# Patient Record
Sex: MALE | Race: WHITE | NOT HISPANIC OR LATINO | Employment: FULL TIME | URBAN - METROPOLITAN AREA
[De-identification: names, ages, dates, MRNs, and addresses within clinical notes are randomized per-mention and may not be internally consistent; named-entity substitution may affect disease eponyms.]

---

## 2017-06-12 ENCOUNTER — HOSPITAL ENCOUNTER (EMERGENCY)
Facility: CLINIC | Age: 37
Discharge: ANOTHER HOSPITAL | End: 2017-06-12
Attending: PHYSICIAN ASSISTANT | Admitting: PHYSICIAN ASSISTANT
Payer: OTHER MISCELLANEOUS

## 2017-06-12 ENCOUNTER — APPOINTMENT (OUTPATIENT)
Dept: CT IMAGING | Facility: CLINIC | Age: 37
End: 2017-06-12
Attending: PHYSICIAN ASSISTANT
Payer: OTHER MISCELLANEOUS

## 2017-06-12 ENCOUNTER — HOSPITAL ENCOUNTER (EMERGENCY)
Facility: CLINIC | Age: 37
Discharge: HOME | End: 2017-06-12
Attending: EMERGENCY MEDICINE | Admitting: EMERGENCY MEDICINE
Payer: OTHER MISCELLANEOUS

## 2017-06-12 VITALS
WEIGHT: 220 LBS | OXYGEN SATURATION: 98 % | SYSTOLIC BLOOD PRESSURE: 135 MMHG | RESPIRATION RATE: 16 BRPM | DIASTOLIC BLOOD PRESSURE: 96 MMHG | TEMPERATURE: 98 F | HEART RATE: 80 BPM | BODY MASS INDEX: 30.68 KG/M2

## 2017-06-12 VITALS
SYSTOLIC BLOOD PRESSURE: 148 MMHG | DIASTOLIC BLOOD PRESSURE: 115 MMHG | OXYGEN SATURATION: 98 % | RESPIRATION RATE: 18 BRPM | HEART RATE: 78 BPM | TEMPERATURE: 98.2 F

## 2017-06-12 DIAGNOSIS — S05.31XA LACERATION OF RIGHT CONJUNCTIVA, INITIAL ENCOUNTER: ICD-10-CM
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
## 2017-06-12 DIAGNOSIS — S05.91XA RIGHT EYE INJURY, INITIAL ENCOUNTER: ICD-10-CM

## 2017-06-12 PROCEDURE — 99283 EMERGENCY DEPT VISIT LOW MDM: CPT | Performed by: EMERGENCY MEDICINE

## 2017-06-12 PROCEDURE — 99285 EMERGENCY DEPT VISIT HI MDM: CPT | Mod: 25

## 2017-06-12 PROCEDURE — 70480 CT ORBIT/EAR/FOSSA W/O DYE: CPT

## 2017-06-12 PROCEDURE — 99207 ZZC APP CREDIT; MD BILLING SHARED VISIT: CPT | Mod: Z6 | Performed by: EMERGENCY MEDICINE

## 2017-06-12 RX ORDER — MOXIFLOXACIN 5 MG/ML
1 SOLUTION/ DROPS OPHTHALMIC
Qty: 1 BOTTLE | Refills: 0 | Status: SHIPPED | OUTPATIENT
Start: 2017-06-12 | End: 2017-06-19

## 2017-06-12 RX ORDER — ERYTHROMYCIN 5 MG/G
1 OINTMENT OPHTHALMIC 3 TIMES DAILY
Qty: 1 TUBE | Refills: 0 | Status: SHIPPED | OUTPATIENT
Start: 2017-06-12 | End: 2022-03-21

## 2017-06-12 ASSESSMENT — ENCOUNTER SYMPTOMS
CONSTIPATION: 0
BRUISES/BLEEDS EASILY: 0
HEADACHES: 0
BACK PAIN: 0
FREQUENCY: 0
SORE THROAT: 0
EYE DISCHARGE: 0
PALPITATIONS: 0
COUGH: 0
BLOOD IN STOOL: 0
DYSPHORIC MOOD: 0
NECK PAIN: 0
ADENOPATHY: 0
PHOTOPHOBIA: 1
DIZZINESS: 0
DIARRHEA: 0
EYE PAIN: 1
NUMBNESS: 0
ABDOMINAL PAIN: 0
EYE REDNESS: 1
SHORTNESS OF BREATH: 0
LIGHT-HEADEDNESS: 0
EYE REDNESS: 1
WEAKNESS: 0
HEMATURIA: 0
COLOR CHANGE: 0
POLYDIPSIA: 0
VOMITING: 0
DYSURIA: 0
NAUSEA: 0
FEVER: 0
NERVOUS/ANXIOUS: 0
VOICE CHANGE: 0
CHILLS: 0
TROUBLE SWALLOWING: 0
DIAPHORESIS: 0
EYE PAIN: 1

## 2017-06-12 ASSESSMENT — VISUAL ACUITY
OD: 20/100
OS: 20/15

## 2017-06-12 NOTE — ED AVS SNAPSHOT
[facility], Emergency Department    [address] [place] [address]    Phone:  [phone]                                       Andrae Ruelas   MRN: [id number]    Department:  [facility], Emergency Department   Date of Visit:  6/12/2017           Patient Information     Date Of Birth          1980        Your diagnoses for this visit were:     None       You were seen by Vickie Crowley MD.        Discharge Instructions           FOLLOW-UP:  - You have an appointment to follow up with Eye Clinic (phone: [phone]) at 8:30 AM tomorrow     PRESCRIPTIONS / MEDICATIONS:  - Please use the Vigamox drops 4 times a day, and the erythromycin ointment 3 times a day, as described by the Ophthalmology team.    --- Continue these medications until told otherwise by their team.    OTHER INSTRUCTIONS:  - Continue with the other recommendations from Ophthalmology    RETURN TO THE EMERGENCY DEPARTMENT  Return to the Emergency Department at any time for new/worsening symptoms.       24 Hour Appointment Hotline       To make an appointment at any [facility] clinic, call [phone] ([phone]). If you don't have a family doctor or clinic, we will help you find one. [facility] clinics are conveniently located to serve the needs of you and your family.             Review of your medicines      START taking        Dose / Directions Last dose taken    erythromycin ophthalmic ointment   Commonly known as:  ROMYCIN   Dose:  1 Application   Quantity:  1 Tube        Place 1 Application into the right eye 3 times daily   Refills:  0        moxifloxacin 0.5 % ophthalmic solution   Commonly known as:  VIGAMOX   Dose:  1 drop   Quantity:  1 Bottle        Place 1 drop into the right eye every 4 hours (while awake) for 7 days   Refills:  0          Our records show that you are taking the medicines listed below. If these are incorrect, please call your family doctor or clinic.        Dose / Directions Last dose  taken    clobetasol 0.05 % external solution   Commonly known as:  TEMOVATE        Refills:  6        cyclobenzaprine 10 MG tablet   Commonly known as:  FLEXERIL   Dose:  10 mg   Quantity:  20 tablet        Take 1 tablet (10 mg) by mouth nightly as needed for muscle spasms   Refills:  0        hydrocortisone 2.5 % cream   Commonly known as:  ANUSOL-HC   Quantity:  30 g        Place rectally 2 times daily   Refills:  1        naproxen 500 MG tablet   Commonly known as:  NAPROSYN   Dose:  500 mg   Quantity:  40 tablet        Take 1 tablet (500 mg) by mouth 2 times daily as needed for moderate pain   Refills:  1        omeprazole 40 MG capsule   Commonly known as:  priLOSEC   Dose:  40 mg   Quantity:  30 capsule        Take 1 capsule (40 mg) by mouth daily Take 30-60 minutes before a meal.   Refills:  0                Prescriptions were sent or printed at these locations (2 Prescriptions)                   Other Prescriptions                Printed at Department/Unit printer (2 of 2)         moxifloxacin (VIGAMOX) 0.5 % ophthalmic solution               erythromycin (ROMYCIN) ophthalmic ointment                Orders Needing Specimen Collection     None      Pending Results     No orders found from 6/10/2017 to 6/13/2017.            Pending Culture Results     No orders found from 6/10/2017 to 6/13/2017.            Pending Results Instructions     If you had any lab results that were not finalized at the time of your Discharge, you can call the ED Lab Result RN at [phone]. You will be contacted by this team for any positive Lab results or changes in treatment. The nurses are available 7 days a week from 10A to 6:30P.  You can leave a message 24 hours per day and they will return your call.        Thank you for choosing [facility]       Thank you for choosing [facility] for your care. Our goal is always to provide you with excellent care. Hearing back from our patients is one way we can continue to improve our  services. Please take a few minutes to complete the written survey that you may receive in the mail after you visit with us. Thank you!        [employer] Information     [employer] gives you secure access to your electronic health record. If you see a primary care provider, you can also send messages to your care team and make appointments. If you have questions, please call your primary care clinic.  If you do not have a primary care provider, please call [phone] and they will assist you.        Care EveryWhere ID     This is your Care EveryWhere ID. This could be used by other organizations to access your [place] medical records  [id number]        After Visit Summary       This is your record. Keep this with you and show to your community pharmacist(s) and doctor(s) at your next visit.

## 2017-06-12 NOTE — ED PROVIDER NOTES
History     Chief Complaint:  Eye injury    HPI   Andrae Ruelas is a 36 year old male who presents to the ED for evaluation of right eye pain. The patient states he was fixing a wire on a garage when it hit him in the right eye but retracted back. He felt immediate pain to the eye and decreased vision (though took his contact lens out). The patient noted his eye to be slightly red, which prompted him to present here for further evaluation. Tetanus up to date.     Allergies:  NKDA     Medications:    clobetasol (TEMOVATE) 0.05 % external solution   cyclobenzaprine (FLEXERIL) 10 MG tablet   naproxen (NAPROSYN) 500 MG tablet   omeprazole (PRILOSEC) 40 MG capsule   hydrocortisone (ANUSOL-HC) 2.5 % rectal cream     Past Medical History:    Psoriasis  GERD  External hemorrhoids     Past Surgical History:    ORIF ankle    Family History:    History reviewed. No pertinent family history.     Social History:  Marital Status:   [2]  Former smoker.   No alcohol use.      Review of Systems   Eyes: Positive for pain (right), redness (right) and visual disturbance (right).   All other systems reviewed and are negative.      Physical Exam     Patient Vitals for the past 24 hrs:   BP Temp Temp src Pulse Resp SpO2   06/12/17 1804 (!) 148/115 98.2  F (36.8  C) Oral - - -   06/12/17 1754 (!) 159/108 - - 78 18 98 %      Physical Exam  Nursing note and vitals reviewed.     GENERAL: Alert, mild distress.   HEENT:   MMM. PERRLA. EOMI.   Visual acuity: right eye - 20/50; left eye - 20/20.  Intraocular pressure right eye: 17    Right eye: 0.25 cm superficial laceration to medial aspect of conjunctiva. Small subconjunctival hemorrhage surrounding laceration. No scleral icterus. On slit lamp exam - no foreign body noted in cornea or conjunctiva. No hyphema. Using fluorescein stain, uptake at site of laceration, does not move, no evidence of sidel sign. No corneal abrasion or ulcer. No foreign body.   Left eye: Normal  conjunctiva. No scleral icterus.     No battles signs. No racoon eyes. No periorbital swelling or tenderness.     NECK: Supple.  PULMONARY: Breathing comfortable at rest.   NEURO: Alert and oriented. Non-focal.   MUSCULOSKELETAL: Normal range of motion.  SKIN: Skin is warm and dry. No rashes. No pallor or jaundice.   PSYCH: Normal affect and mood.       Emergency Department Course     Imaging:   CT temporal orbital sella w/o contrast:  No evidence of orbita trauma.  Per radiology, preliminary result.     Emergency Department Course:  The patient arrived in triage where vitals were measured and recorded.   The patient was then escorted back to the emergency department.   The patient's medical records were reviewed.  Nursing notes and vitals were reviewed.    I performed an exam of the patient as documented above. The patient is in agreement with my plan of care.      I discussed the patient with Dr. Chavis of opthalmology who reviewed the CT scan. She called me back stating this does appear superficial, though would like to evaluate him tonight. Given this, patient will be transferred to [facility].     Impression & Plan      Medical Decision Making:  This is a 36 year old male who wears contacts lens and presents to the ED for evaluation of a right eye injury while at work. On exam, he does have evidence of a small 0.25 cm conjunctival laceration. No signs of sidel sign or notable open globe. CT scan obtained for further evaluation which showed no signs of orbital trauma. Eye pressure WNL at 17. No evidence of corneal abrasion, ulcer or foreign body. Tetanus up to date. I discussed patient with Dr. Chavis of opthalmology who reviewed the images and was in agreement this is likely superficial;though with that said in the setting of his mechanism, sometimes, the area can close off and pressures and imaging can appear normal when there is further trauma. Given this, she preferred to see patient in the  ED at [facility]. Will have patient transfer here by private vehicle.     Of note, blood pressure elevated here. No history of hypertension. Possibly transiently elevated in setting of his injury. Otherwise hemodynamically stable.     Diagnosis:    ICD-10-CM    1. Laceration of right conjunctiva, initial encounter S05.31XA    2. Elevated blood pressure I10        Disposition:  Transfer to [facility]         Elmira Ambrocio  6/12/2017   [facility] EMERGENCY DEPARTMENT       Elmira Ambrocio PA-C  06/12/17 1813

## 2017-06-13 ENCOUNTER — OFFICE VISIT (OUTPATIENT)
Dept: OPHTHALMOLOGY | Facility: CLINIC | Age: 37
End: 2017-06-13
Attending: OPHTHALMOLOGY
Payer: COMMERCIAL

## 2017-06-13 DIAGNOSIS — S05.01XD: Primary | ICD-10-CM

## 2017-06-13 PROCEDURE — 99214 OFFICE O/P EST MOD 30 MIN: CPT | Mod: ZF

## 2017-06-13 ASSESSMENT — TONOMETRY
OD_IOP_MMHG: 22
IOP_METHOD: ICARE
OS_IOP_MMHG: 23

## 2017-06-13 ASSESSMENT — VISUAL ACUITY
CORRECTION_TYPE: GLASSES
METHOD: SNELLEN - LINEAR
OD_CC: 20/40
OS_PH_CC: 20/25
OS_CC+: -1
OD_CC+: +1
OS_CC: 20/60
OD_PH_CC: 20/20

## 2017-06-13 ASSESSMENT — REFRACTION_WEARINGRX
OS_SPHERE: -3.00
OS_AXIS: 000
OD_SPHERE: -4.00
OD_AXIS: 014
OD_CYLINDER: +1.00
OS_CYLINDER: +0.00

## 2017-06-13 ASSESSMENT — CUP TO DISC RATIO
OS_RATIO: 0.3
OD_RATIO: 0.3

## 2017-06-13 ASSESSMENT — CONF VISUAL FIELD
OS_NORMAL: 1
OD_NORMAL: 1

## 2017-06-13 ASSESSMENT — SLIT LAMP EXAM - LIDS
COMMENTS: NORMAL
COMMENTS: NORMAL

## 2017-06-13 ASSESSMENT — EXTERNAL EXAM - LEFT EYE: OS_EXAM: NORMAL

## 2017-06-13 ASSESSMENT — EXTERNAL EXAM - RIGHT EYE: OD_EXAM: NORMAL

## 2017-06-13 NOTE — MR AVS SNAPSHOT
After Visit Summary   6/13/2017    Andrae Ruelas    MRN: [id number]           Patient Information     Date Of Birth          1980        Visit Information        Provider Department      6/13/2017 8:30 AM Axel Hendrix MD [facility]        Today's Diagnoses     Superficial injury of conjunctiva, right, subsequent encounter    -  1      Care Instructions    Begin moxifloxacin/Vigamox (Tap) 4 times daily right eye  Begin erythromycin ointment 1/2 inch into the right eye at bedtime    Return 1-2 weeks for recheck and tint assessment          Follow-ups after your visit        Follow-up notes from your care team     Return for Follow Up 2-3 weeks with tint assessment .      Who to contact     Please call your clinic at [phone] to:    Ask questions about your health    Make or cancel appointments    Discuss your medicines    Learn about your test results    Speak to your doctor   If you have compliments or concerns about an experience at your clinic, or if you wish to file a complaint, please contact [facility] Patient Relations at [phone] or email us at [email or web address]         Additional Information About Your Visit        MyChart Information     [employer] gives you secure access to your electronic health record. If you see a primary care provider, you can also send messages to your care team and make appointments. If you have questions, please call your primary care clinic.  If you do not have a primary care provider, please call [phone] and they will assist you.      [employer] is an electronic gateway that provides easy, online access to your medical records. With [employer], you can request a clinic appointment, read your test results, renew a prescription or communicate with your care team.     To access your existing account, please contact your [facility] Clinic or call [phone] for assistance.         Care EveryWhere ID     This is your Care EveryWhere ID. This could be used by other organizations to access your [place] medical records  [id number]         Blood Pressure from Last 3 Encounters:   06/12/17 (!) 135/96   06/12/17 (!) 148/115   08/15/16 125/86    Weight from Last 3 Encounters:   06/12/17 99.8 kg (220 lb)   08/15/16 97.5 kg (215 lb)   06/02/16 96.2 kg (212 lb)              Today, you had the following     No orders found for display       Primary Care Provider Office Phone # Fax #    Domingo Hart [phone]       [place] [address] [place] [address]        Thank you!     Thank you for choosing [facility] CLINIC  for your care. Our goal is always to provide you with excellent care. Hearing back from our patients is one way we can continue to improve our services. Please take a few minutes to complete the written survey that you may receive in the mail after your visit with us. Thank you!             Your Updated Medication List - Protect others around you: Learn how to safely use, store and throw away your medicines at www.disposemymeds.org.          This list is accurate as of: 6/13/17 10:25 AM.  Always use your most recent med list.                   Brand Name Dispense Instructions for use    clobetasol 0.05 % external solution    TEMOVATE         cyclobenzaprine 10 MG tablet    FLEXERIL    20 tablet    Take 1 tablet (10 mg) by mouth nightly as needed for muscle spasms       erythromycin ophthalmic ointment    ROMYCIN    1 Tube    Place 1 Application into the right eye 3 times daily       hydrocortisone 2.5 % cream    ANUSOL-HC    30 g    Place rectally 2 times daily       moxifloxacin 0.5 % ophthalmic solution    VIGAMOX    1 Bottle    Place 1 drop into the right eye every 4 hours (while awake) for 7 days       naproxen 500 MG tablet    NAPROSYN    40 tablet    Take 1 tablet (500 mg) by mouth 2 times daily as needed for moderate pain       omeprazole 40  MG capsule    priLOSEC    30 capsule    Take 1 capsule (40 mg) by mouth daily Take 30-60 minutes before a meal.

## 2017-06-13 NOTE — ED NOTES
Pt injured right eye while working today, hit his eye with a metal jagjit. Complains of 7/10 pain to eye and blurriness. Reports wearing contacts and does not have contact in his injured eye.

## 2017-06-13 NOTE — ED NOTES
Bed: ED09  Expected date:   Expected time:   Means of arrival: Car  Comments:  Andrae Fernandez MRN: [id number]  At work a metal jagjit hit is hand and than his hand hit his R eye.  Needs an optho consult for a conjunctivitital laceration of R eye.

## 2017-06-13 NOTE — PATIENT INSTRUCTIONS
Begin moxifloxacin/Vigamox (Tap) 4 times daily right eye  Begin erythromycin ointment 1/2 inch into the right eye at bedtime    Return 1-2 weeks for recheck and tint assessment

## 2017-06-13 NOTE — ED PROVIDER NOTES
[place] EMERGENCY DEPARTMENT ([facility])  June 12, 2017  ED 9 7:54 PM   History     Chief Complaint   Patient presents with     Eye Injury     The history is provided by the patient and medical records.     Andrae Ruelas is a 36 year old male who presents with right eye injury. He has a history of hypertension, hyperlipidemia, and dermatitis. He also has a history of concussion back in August 2015, has had photophobia bilaterally since the concussion. He wears corrective contact lenses at baseline. Today he sustained a work-related injury. He was using a winding jagjit to adjust the tension of a garage door spring. He was cranking away at it when he felt it suddenly rebound. He caught the jagjit with his hand before it could damage any walls, but unfortunately the jagjit struck him in the right eye, displacing his right contact lens. He had pain right away along with some blurry vision that he attributes to losing the contact in that right eye. He was seen at [facility] ER where visual acuity, intraocular pressure, fundoscopic and slit lamp exam was performed. His right eye was notable for a 0.25 cm superficial laceration to medial aspect of conjunctiva.      He was sent to [facility] ER to be seen by Ophthalmology service for further evaluation of the superificial conjunctival laceration. At this time he feels throbbing right eye pain and photophobia, throbbing pain with photophobia, and notes that this is similar to when he suffered a concussion. His right eye vision is at the level it would be without the contact lens. Last eye exam was 1 year ago, unremarkable.  No other medical problems. He is near sighted at baseline. No known drug allergies, though notes he gets hives after exercising and carries an EpiPen for this. Regarding his concussion and ongoing symptoms, patient did follow up with TBI clinic for this and was discharged, told that his was normal. He has since gone to a chiropractor for  6 months with improvement to his headaches but ongoing photophobia.     No other new symptoms or complaints at this time.  Please see ROS for further details.      I have reviewed the Medications, Allergies, Past Medical and Surgical History, and Social History in the [facility] system.  PAST MEDICAL HISTORY:   Past Medical History:   Diagnosis Date     Displacement of intervertebral disc, site unspecified, without myelopathy     L5-S1 vertebrate -  no surgery     GERD (gastroesophageal reflux disease)      Psoriasis        PAST SURGICAL HISTORY:   Past Surgical History:   Procedure Laterality Date     MYRINGOTOMY, INSERT TUBE BILATERAL, COMBINED      In childhood     OPEN REDUCTION INTERNAL FIXATION ANKLE  6/17/2011    Procedure:OPEN REDUCTION INTERNAL FIXATION ANKLE; 5th Metatarsal Fracture Right ; Surgeon:RADHA KAPADIA; Location:[facility] OR       FAMILY HISTORY:   Family History   Problem Relation Age of Onset     C.A.D. Brother      Breast Cancer Mother      In recovery     Cancer - colorectal Mother      Cancer - colorectal Maternal Grandfather      CANCER Paternal Grandfather      skin cancer     Cancer - colorectal Other        SOCIAL HISTORY:   Social History   Substance Use Topics     Smoking status: Former Smoker     Types: Cigarettes     Quit date: 4/1/2011     Smokeless tobacco: Never Used     Alcohol use No      Comment: Quit in 2008       Patient's Medications   New Prescriptions    No medications on file   Previous Medications    CLOBETASOL (TEMOVATE) 0.05 % EXTERNAL SOLUTION        CYCLOBENZAPRINE (FLEXERIL) 10 MG TABLET    Take 1 tablet (10 mg) by mouth nightly as needed for muscle spasms    HYDROCORTISONE (ANUSOL-HC) 2.5 % RECTAL CREAM    Place rectally 2 times daily    NAPROXEN (NAPROSYN) 500 MG TABLET    Take 1 tablet (500 mg) by mouth 2 times daily as needed for moderate pain    OMEPRAZOLE (PRILOSEC) 40 MG CAPSULE    Take 1 capsule (40 mg) by mouth daily Take 30-60 minutes before a meal.   Modified  Medications    No medications on file   Discontinued Medications    No medications on file          Allergies   Allergen Reactions     Nkda [No Known Drug Allergies]       Review of Systems   Constitutional: Negative for chills, diaphoresis and fever.   HENT: Negative for ear pain, sore throat, tinnitus, trouble swallowing and voice change.    Eyes: Positive for photophobia, pain and redness. Negative for discharge and visual disturbance.   Respiratory: Negative for cough and shortness of breath.    Cardiovascular: Negative for chest pain, palpitations and leg swelling.   Gastrointestinal: Negative for abdominal pain, blood in stool, constipation, diarrhea, nausea and vomiting.   Endocrine: Negative for polydipsia and polyuria.   Genitourinary: Negative for dysuria, frequency, hematuria and urgency.   Musculoskeletal: Negative for back pain and neck pain.   Skin: Negative for color change and rash.   Allergic/Immunologic: Negative for immunocompromised state.   Neurological: Negative for dizziness, weakness, light-headedness, numbness and headaches.   Hematological: Negative for adenopathy. Does not bruise/bleed easily.   Psychiatric/Behavioral: Negative for dysphoric mood. The patient is not nervous/anxious.        Physical Exam   BP: (!) 135/96  Pulse: 80  Temp: 98  F (36.7  C)  Resp: 16  Weight: 99.8 kg (220 lb)  SpO2: 98 %  Physical Exam  CONSTITUTIONAL: Well-developed and well-nourished. Awake and alert. Non-toxic appearance. No acute distress.   HENT:   - Head: Normocephalic and atraumatic.   - Ears: Hearing and external ear grossly normal.   - Nose: Nose normal. No rhinorrhea. No epistaxis.   - Mouth/Throat: Oropharynx is clear and MMM  EYES: right eye conjunctival injection/blood, PERRL. No apparent FB. See Optho note for thorough eye exam.   NECK: Normal range of motion and phonation normal. Neck supple.  No tracheal deviation, no stridor. No edema or erythema noted.  PULMONARY/CHEST: Effort normal. No  accessory muscle usage or stridor. No respiratory distress.    MUSCULOSKELETAL: Extremities warm and seemingly well perfused. No edema or calf tenderness.  NEUROLOGIC: Awake, alert. Not disoriented. He displays no atrophy and no tremor. Normal tone. No seizure activity. Coordination normal. GCS 15  SKIN: Skin is warm and dry. No rash noted. No diaphoresis. No pallor.   PSYCHIATRIC: Normal mood and affect. Speech and behavior normal. Thought processes linear. Cognition and memory are normal.     ED Course     ED Course     Procedures      Assessments & Plan (with Medical Decision Making)   IMPRESSION: 36-year-old male, PMH notable for previous psoriasis/dermatitis, GERD, who most notably had a concussion last August with improved other concussive symptoms and headaches but residual eye sensitivity and light sensitivity, normally a contact wear, though contacts is currently out of the affected eye, presents today for evaluation of trauma to the right eye with associated discomfort as described further above in HPI/ROS.  On examination there does appear to be subconjunctival hemorrhage on the medial portion of the right eye, PERRL, EOMI. no new neck pain, and no clear criteria for need for head CT immediately. Already had CT imaging w/o evidence for perforation or FB (see EMR).    PLAN: Ophthalmology Consult - They already aware of the patient prior to arrival and are already on site, F/U their assessment/recommendations    INTERVENTIONS:   - Ophthalmology Consult    RE-EVALUATION:  - Pt continues to do well.     DISCUSSIONS:  - w/ Ophthalmology: They have seen and evaluated the patient here in the ED.  They believe that he should be good to go at this points and no emergent repair is needed but that he should follow up with them tomorrow morning for recheck, use Vigamox drops and erythromycin ointment and follow strict return instructions.  - w/ Patient: I have reviewed the available findings, plan, need for follow  up, and strict return instructions with the patient. He expressed understanding and agreement with this plan. All questions answered to the best of our ability at this time.     DISPOSITION/PLANNING:  - FINAL IMPRESSION: Right eye pain, injury (see Optho note for further details)  - DISPOSITION: D/C to home  --- Follow-up: w/ Ophthalmology tomorrow morning  --- Recommendations: Eye protection cares  --- Rx: Vigamox drops, erythromycin ointment      ______________________________________________________________________________    - I have reviewed the available nursing notes.      New Prescriptions    No medications on file       Final diagnoses:   None     I, Raina Joseph, am serving as a trained medical scribe to document services personally performed by Vickie Crowley MD, based on the provider's statements to me.  This document has been checked and approved by Dr. Carline BEAR, Vickie Crowley MD, was physically present and have reviewed and verified the accuracy of this note documented by Raina Joseph, medical scribe.     6/12/2017   [facility], [place], EMERGENCY DEPARTMENT     Vickie Crowley MD  06/13/17 1933

## 2017-06-13 NOTE — NURSING NOTE
Chief Complaints and History of Present Illnesses   Patient presents with     Follow Up For     Blunt trauma RE      HPI    Additional Referring Providers:  Gabriella Moss    Affected eye(s):  Right   Symptoms:     Floaters   Flashes (Comment: Pt notes that he has seen lightning bolts in vision, noticed when Dr was applying pressure to the eye yesterday)   Redness   Photophobia   Eye discharge      Duration:  1 day   Frequency:  Constant       Do you have eye pain now?:  Yes   Location:  OD   Pain Level:  Moderate Pain (5)   Other:  Pt notes alot of pain with bright lights and pain with eye movement    Pain Characteristics:  Aching      Comments:   Pt here to follow up on RE following blunt trauma accident RE from work. Pt was installing or fixing a garage door spring and got hit with it. Pt complains of redness, dark spot in vision today and a lot of pain. Would rate the pain a 5 today, notes that he has not filled any eye prescriptions yet. NOREEN RAMOS, COA 9:14 AM 06/13/2017

## 2017-06-13 NOTE — PROGRESS NOTES
CC: ER Follow-up    HPI: Andrae Ruelas is a 36 year old male who presents for ER follow-up. He says yesterday he was winding a spring for a garage door and it came loose and struck him on the right side of the face. He was seen by our resident Dr. Chavis last evening and presents this AM for follow-up.     This morning he says his vision remains at baseline although he is wearing old glasses today and usually wears contact lenses. He is most bothered by light sensitivity. He has chronic light sensitivity since a concussion earlier this year. He has mild eye irritation.    POHx:  Contact lens wear    Current Eye Medications:   None    Assessment & Plan   Andrae Ruelas is a 36 year old male with the following diagnoses:     1. Blunt Trauma, Right Eye   - there are mild conjunctival lacerations, conjunctival epithelial defect, subconjunctival hemorrhage   - there is no anterior chamber cell to suggest iritis   - there fundus exam is normal without commotio retinae or intraocular foreign body   - outside CT without intraocular foreign body   - begin Vigamox QID OD   - begin erythromycin ointment QHS OD    Return 2-3 weeks with tint assessment due to chronic light sensitivity from previous concussion   Discussed warning signs to return earlier    Discussed with Dr. Woodruff,    Axel Hendrix MD PGY-3  Ophthalmology      Attending Physician Attestation:  Complete documentation of historical and exam elements from today's encounter can be found in the full encounter summary report (not reduplicated in this progress note).  I personally obtained the chief complaint(s) and history of present illness.  I confirmed and edited as necessary the review of systems, past medical/surgical history, family history, social history, and examination findings as documented by others; and I examined the patient myself.  I personally reviewed the relevant tests, images, and reports as documented above.  I formulated and  edited as necessary the assessment and plan and discussed the findings and management plan with the patient and family. . - Sahil Woodruff MD

## 2017-06-13 NOTE — CONSULTS
OPHTHALMOLOGY CONSULT NOTE  06/12/17    Patient: Andrae Ruelas  Consulted by: ED staff  Reason for Consult: Right eye injury    HISTORY OF PRESENTING ILLNESS:     Andrae Ruelas is a 36 year old male with hx of HTN, HLD, and concussion ~1yr ago who presents to the ED as a transfer from [facility] ED for further evaluation of right eye injury. Pt reports he was working on a customer's garage door at the time of the injury - states he was using a metal jagjit to reset a spring in the garage door when the jagjit backfired. He caught the jagjit with his hand, but it ran into his right eye as it continued to travel backward. He noted onset of eye pain and blurry vision immediately after the injury. States his contact lens was dislodged from the right eye at the time of the injury. He was not wearing eye protection at the time of the injury. He noticed that the eye began to turn red, which caused him to present to [facility] ED for evaluation. [facility] was concerned for conj lac, but transfer to [facility] ED was recommended for ophthalmology evaluation. Currently, pt reports 7/10 dull eye pain in right eye and photosensitivity in both eyes. Reports daily headaches and photosensitivity at baseline related to hx of concussion (states he previously followed with TBI or similar clinic for this but was discharged). States vision is blurry but is near normal baseline for when he's not wearing his contact lenses. Denies flashes and is unsure if he has any new floaters in either eye. Ocular hx is significant only for soft contact lens wear. Pt denies hx eye surgery. Last eye exam was <1yr ago per pt report.    Review of systems were otherwise negative except for that which has been stated above.      OCULAR/MEDICAL/SURGICAL HISTORIES:     Past Ocular History:  Soft contact lens wear  No eye surgeries    Past Medical History:   Diagnosis Date     Displacement of intervertebral disc, site unspecified, without myelopathy     L5-S1  "vertebrate -  no surgery     GERD (gastroesophageal reflux disease)      Psoriasis        Past Surgical History:   Procedure Laterality Date     MYRINGOTOMY, INSERT TUBE BILATERAL, COMBINED      In childhood     OPEN REDUCTION INTERNAL FIXATION ANKLE  6/17/2011    Procedure:OPEN REDUCTION INTERNAL FIXATION ANKLE; 5th Metatarsal Fracture Right ; Surgeon:RADHA KAPADIA; Location:[place] OR       EXAMINATION:     Visual Acuity (near card sc): Right Eye 20/20 (near card at 8\") ; Left Eye 20/20 (near card at 14\")   Pupils: Equal, round and reactive to light, no afferent pupillary defect.    Intraocular Pressure: RE  16 ; LE 22 (over SCL)  mmHg by tonopen (<5% error).   Motility: RE Full; LE Full  Confrontational Visual Field: RE Full; LE Full     External/Slit Lamp Exam   RIGHT EYE   Lids/Lashes: Mild edema and erythema   Conj/Sclera: 2+ injection, 2+ chemosis, and mild subconj heme medially; three linear conjunctival lacerations in medial conj oriented obliquely measuring 5.0mm, 4.7mm, and 4.5mm (measurements progressing from superior to inferior), faint conj staining around lacerations, Carmella negative, no scleral laceration appreciated with mobilization of conjunctiva   Cornea:  Clear   Ant Chamber:  Deep, 1+ flare (post-fluorescein)   Iris: Round and Reactive   Lens: Clear  LEFT   Lids/lashes: No Abnormality   Conj/Sclera: White and Quiet   Cornea:  Clear, SCL in place   Ant Chamber:  Deep and Quiet   Iris: Round and Reactive   Lens:Clear    Dilated Fundus Exam  Eyes Dilated? Yes  Time: 8:35pm With Phenylephrine 2.5% and Mydriacyl 1%    (Normally, dilation with the above lasts about 4-6 hours)  RIGHT:   Anterior Vitreous: Clear, negative Donaldo's sign   Optic Nerve: Normal    Cup to Disc Ratio: 0.2   Macula: Flat    Vessels: Normal    Retinal Periphery: No Holes or Tears Identified  LEFT:   Anterior Vitreous: Clear, negative [place]'s sign   Optic Nerve: Normal   Cup to Disc Ratio: 0.2   Macula: Flat    Vessels: " "Normal    Retinal Periphery: No Holes or Tears Identified    Labs/Studies/Imaging Performed  CT temporal orbital sella w/o contrast (at outside hospital):  IMPRESSION: No evidence of orbital trauma     ASSESSMENT/PLAN:     Andrae Ruelas is a 36 year old male with the following diagnoses:    Conjunctival lacerations, right eye:  - After blunt trauma injury to the right eye   - Three conj lacs appreciated (described above) with surrounding chemosis and subconj heme. No scleral lacs appreciated with mobilization of conj. Carmella negative  - Pt denies change in vision from baseline without CL in place and uncorrected VA in right eye is 20/20 with near card at 8\". Pt denies flashes and is unsure if new floaters  - CT temporal orbital sella w/o contrast at OSH showed no evidence of orbital trauma or intraocular foreign body  - Dilated exam showed no evidence of retinal damage  - Recommend contact lens holiday in right eye  - Start vigamox 4x/day in the right eye and erythromycin ointment 3x/day in the right eye (discussed with pt that this will make vision blurry)  - F/u in eye clinic at [facility] at 8:30AM tomorrow morning with Dr. Woodruff/Simeon  - Return precautions discussed including gush of fluid, decrease in vision, new flashes/floaters, or other new or concerning symptoms  - Discussed importance of eye protection while at work  - Discussed with Dr. Lara    It is our pleasure to participate in this patient's care and treatment. Please contact us with any further questions or concerns.    Discussed with Dr. Jane Chavis   PGY-2 Ophthalmology    "

## 2017-06-13 NOTE — DISCHARGE INSTRUCTIONS
FOLLOW-UP:  - You have an appointment to follow up with Eye Clinic (phone: [phone]) at 8:30 AM tomorrow   --- This is at the [facility]    PRESCRIPTIONS / MEDICATIONS:  - Please use the Vigamox drops 4 times a day, and the erythromycin ointment 3 times a day, as described by the Ophthalmology team.    --- Continue these medications until told otherwise by their team.    OTHER INSTRUCTIONS:  - Continue with the other recommendations from Ophthalmology    RETURN TO THE EMERGENCY DEPARTMENT  Return to the Emergency Department at any time for new/worsening symptoms.

## 2017-06-27 ENCOUNTER — OFFICE VISIT (OUTPATIENT)
Dept: OPHTHALMOLOGY | Facility: CLINIC | Age: 37
End: 2017-06-27
Attending: OPHTHALMOLOGY
Payer: OTHER MISCELLANEOUS

## 2017-06-27 DIAGNOSIS — S05.01XD: Primary | ICD-10-CM

## 2017-06-27 PROCEDURE — 99213 OFFICE O/P EST LOW 20 MIN: CPT | Mod: ZF

## 2017-06-27 ASSESSMENT — CUP TO DISC RATIO
OD_RATIO: 0.3
OS_RATIO: 0.3

## 2017-06-27 ASSESSMENT — REFRACTION_WEARINGRX
OD_SPHERE: -4.00
OS_SPHERE: -3.00
OD_AXIS: 014
OS_AXIS: 000
OS_CYLINDER: +0.00
OD_CYLINDER: +1.00

## 2017-06-27 ASSESSMENT — VISUAL ACUITY
OD_CC: 20/20
METHOD: SNELLEN - LINEAR
OS_CC+: +1
CORRECTION_TYPE: GLASSES
OS_CC: 20/25

## 2017-06-27 ASSESSMENT — CONF VISUAL FIELD
METHOD: COUNTING FINGERS
OD_NORMAL: 1
OS_NORMAL: 1

## 2017-06-27 ASSESSMENT — EXTERNAL EXAM - RIGHT EYE: OD_EXAM: NORMAL

## 2017-06-27 ASSESSMENT — TONOMETRY
IOP_METHOD: ICARE
OD_IOP_MMHG: 22
OS_IOP_MMHG: 19

## 2017-06-27 ASSESSMENT — SLIT LAMP EXAM - LIDS
COMMENTS: NORMAL
COMMENTS: NORMAL

## 2017-06-27 ASSESSMENT — EXTERNAL EXAM - LEFT EYE: OS_EXAM: NORMAL

## 2017-06-27 NOTE — NURSING NOTE
Chief Complaints and History of Present Illnesses   Patient presents with     Follow Up For     Blunt Trauma, Right Eye 6/12/17     HPI    Affected eye(s):  Both   Symptoms:     (Comment: Vision is doing pretty good BE.)   Floaters (Comment: floaters in BE for last 10 years, unchanged.)   No flashes (Comment: only had RE with first visit while  doing test, nothing since.)   No redness   No tearing   No itching         Do you have eye pain now?:  Yes   Location:  OD   Pain Level:  Mild Pain (3)   Pain Duration:  2 weeks   Pain Frequency:  Constant   Other:  Still having headaches and pressure feeling behind RE, has improved since injury      Comments:  Blunt Trauma, Right Eye 6/12/17. When patient looking at door, edge looks curved since last Monday.     Aren SPEARS  10:24 AM06/27/2017

## 2017-06-27 NOTE — PROGRESS NOTES
CC: ER Follow-up    HPI: Andrae Ruelas is a 36 year old male who presents for follow-up. He sustained blunt trauma to his right eye 6/12 while at work. He was found to have some conjunctival lacerations. He also has a history of concussion 8/12/16.    Today, he says his light sensitivity has improved to a 2/10. The eye is comfortable. He has returned to wearing his contact lenses.    POHx:  Contact lens wear    Current Eye Medications:   None    Assessment & Plan   Andrae Ruelas is a 36 year old male with the following diagnoses:     1. Blunt Trauma, Right Eye   - conjunctival lacerations have resolved   - exam is normal   - discontinue Vigamox & erythromycin ointment     Return next available for refraction and tint assessment due to chronic light sensitivity from previous concussion     Discussed with Dr. Woodruff,    Axel Hendrix MD PGY-3  Ophthalmology    Attending Physician Attestation:  Complete documentation of historical and exam elements from today's encounter can be found in the full encounter summary report (not reduplicated in this progress note).  I personally obtained the chief complaint(s) and history of present illness.  I confirmed and edited as necessary the review of systems, past medical/surgical history, family history, social history, and examination findings as documented by others; and I examined the patient myself.  I personally reviewed the relevant tests, images, and reports as documented above.  I formulated and edited as necessary the assessment and plan and discussed the findings and management plan with the patient and family. . - Sahil Woodruff MD

## 2017-06-27 NOTE — MR AVS SNAPSHOT
After Visit Summary   6/27/2017    Andrae Ruelas    MRN: [id number]           Patient Information     Date Of Birth          1980        Visit Information        Provider Department      6/27/2017 10:00 AM Axel Hendrix MD Eye Clinic        Today's Diagnoses     Superficial injury of conjunctiva, right, subsequent encounter    -  1      Care Instructions    Our technician Ness will contact you for a tint assessment              Follow-ups after your visit        Follow-up notes from your care team     Return for next available tint assessment with Ness .      Who to contact     Please call your clinic at [phone] to:    Ask questions about your health    Make or cancel appointments    Discuss your medicines    Learn about your test results    Speak to your doctor   If you have compliments or concerns about an experience at your clinic, or if you wish to file a complaint, please contact [facility] Patient Relations at [phone] or email us at [email or web address]         Additional Information About Your Visit        [employer] Information     [employer] gives you secure access to your electronic health record. If you see a primary care provider, you can also send messages to your care team and make appointments. If you have questions, please call your primary care clinic.  If you do not have a primary care provider, please call [phone] and they will assist you.      [employer] is an electronic gateway that provides easy, online access to your medical records. With [employer], you can request a clinic appointment, read your test results, renew a prescription or communicate with your care team.     To access your existing account, please contact your [facility] or call [phone] for assistance.        Care EveryWhere ID     This is your Care EveryWhere ID. This could be used by other organizations to access  your [place] medical records  [id number]         Blood Pressure from Last 3 Encounters:   06/12/17 (!) 135/96   06/12/17 (!) 148/115   08/15/16 125/86    Weight from Last 3 Encounters:   06/12/17 99.8 kg (220 lb)   08/15/16 97.5 kg (215 lb)   06/02/16 96.2 kg (212 lb)              Today, you had the following     No orders found for display       Primary Care Provider Office Phone # Fax #    Domingo AYANA Hart [phone]       [facility] [address]  [facility] [address]        Equal Access to Services     [facility]: Hadii annemarie Hart, waaxda kulwant, robyn kaalmada abebe, belkys zaragoza. So [facility] [phone].    ATENCIÓN: Si habla español, tiene a brannon disposición servicios gratuitos de asistencia lingüística. [facility] [phone].    We comply with applicable federal civil rights laws and [place] laws. We do not discriminate on the basis of race, color, national origin, age, disability sex, sexual orientation or gender identity.            Thank you!     Thank you for choosing EYE CLINIC  for your care. Our goal is always to provide you with excellent care. Hearing back from our patients is one way we can continue to improve our services. Please take a few minutes to complete the written survey that you may receive in the mail after your visit with us. Thank you!             Your Updated Medication List - Protect others around you: Learn how to safely use, store and throw away your medicines at www.disposemymeds.org.          This list is accurate as of: 6/27/17 12:00 PM.  Always use your most recent med list.                   Brand Name Dispense Instructions for use Diagnosis    clobetasol 0.05 % external solution    TEMOVATE          cyclobenzaprine 10 MG tablet    FLEXERIL    20 tablet    Take 1 tablet (10 mg) by mouth nightly as needed for muscle spasms    Upper back strain, initial encounter       erythromycin ophthalmic  ointment    ROMYCIN    1 Tube    Place 1 Application into the right eye 3 times daily        hydrocortisone 2.5 % cream    ANUSOL-HC    30 g    Place rectally 2 times daily    External hemorrhoids       naproxen 500 MG tablet    NAPROSYN    40 tablet    Take 1 tablet (500 mg) by mouth 2 times daily as needed for moderate pain    Cervical strain, initial encounter       omeprazole 40 MG capsule    priLOSEC    30 capsule    Take 1 capsule (40 mg) by mouth daily Take 30-60 minutes before a meal.    GERD (gastroesophageal reflux disease)

## 2017-07-12 ENCOUNTER — ALLIED HEALTH/NURSE VISIT (OUTPATIENT)
Dept: OPHTHALMOLOGY | Facility: CLINIC | Age: 37
End: 2017-07-12
Attending: OPHTHALMOLOGY
Payer: OTHER MISCELLANEOUS

## 2017-07-12 DIAGNOSIS — H53.143 VISUAL DISCOMFORT, BILATERAL: Primary | ICD-10-CM

## 2017-07-12 PROCEDURE — 40000269 ZZH STATISTIC NO CHARGE FACILITY FEE: Mod: ZF

## 2017-07-12 NOTE — MR AVS SNAPSHOT
After Visit Summary   7/12/2017    Andrae Ruelas    MRN: [id number]           Patient Information     Date Of Birth          1980        Visit Information        Provider Department      7/12/2017 3:00 PM [facility]        Today's Diagnoses     Visual discomfort, bilateral    -  1       Follow-ups after your visit        Who to contact     Please call your clinic at [phone] to:    Ask questions about your health    Make or cancel appointments    Discuss your medicines    Learn about your test results    Speak to your doctor   If you have compliments or concerns about an experience at your clinic, or if you wish to file a complaint, please contact [facility] Patient Relations at [phone] or email us at [email or web address]         Additional Information About Your Visit        MyChart Information     [facility] gives you secure access to your electronic health record. If you see a primary care provider, you can also send messages to your care team and make appointments. If you have questions, please call your primary care clinic.  If you do not have a primary care provider, please call [phone] and they will assist you.      [employer] is an electronic gateway that provides easy, online access to your medical records. With [employer], you can request a clinic appointment, read your test results, renew a prescription or communicate with your care team.     To access your existing account, please contact your [facility] or call [phone] for assistance.        Care EveryWhere ID     This is your Care EveryWhere ID. This could be used by other organizations to access your [place] medical records  [id number]         Blood Pressure from Last 3 Encounters:   06/12/17 (!) 135/96   06/12/17 (!) 148/115   08/15/16 125/86    Weight from Last 3 Encounters:   06/12/17 99.8 kg (220 lb)   08/15/16 97.5 kg (215 lb)    06/02/16 96.2 kg (212 lb)              Today, you had the following     No orders found for display       Primary Care Provider Office Phone # Fax #    Domingo Hart [phone]       [facility] [address] [place] [address]        Equal Access to Services     [facility]: Hadii aad ku hadasho Soomaali, waaxda luqadaha, qaybta kaalmada adeegyada, waxay judyin haydian adegabriel jones laalexn ah. So [facility] [phone].    ATENCIÓN: Si habla español, tiene a brannon disposición servicios gratuitos de asistencia lingüística. [facility] [phone].    We comply with applicable federal civil rights laws and [place] laws. We do not discriminate on the basis of race, color, national origin, age, disability sex, sexual orientation or gender identity.            Thank you!     Thank you for choosing EYE CLINIC  for your care. Our goal is always to provide you with excellent care. Hearing back from our patients is one way we can continue to improve our services. Please take a few minutes to complete the written survey that you may receive in the mail after your visit with us. Thank you!             Your Updated Medication List - Protect others around you: Learn how to safely use, store and throw away your medicines at www.disposemymeds.org.          This list is accurate as of: 7/12/17 11:59 PM.  Always use your most recent med list.                   Brand Name Dispense Instructions for use Diagnosis    clobetasol 0.05 % external solution    TEMOVATE          cyclobenzaprine 10 MG tablet    FLEXERIL    20 tablet    Take 1 tablet (10 mg) by mouth nightly as needed for muscle spasms    Upper back strain, initial encounter       erythromycin ophthalmic ointment    ROMYCIN    1 Tube    Place 1 Application into the right eye 3 times daily        hydrocortisone 2.5 % cream    ANUSOL-HC    30 g    Place rectally 2 times daily    External hemorrhoids       naproxen 500 MG tablet    NAPROSYN    40  tablet    Take 1 tablet (500 mg) by mouth 2 times daily as needed for moderate pain    Cervical strain, initial encounter       omeprazole 40 MG capsule    priLOSEC    30 capsule    Take 1 capsule (40 mg) by mouth daily Take 30-60 minutes before a meal.    GERD (gastroesophageal reflux disease)

## 2018-02-02 ENCOUNTER — HOSPITAL ENCOUNTER (EMERGENCY)
Facility: CLINIC | Age: 38
Discharge: HOME | End: 2018-02-02
Attending: EMERGENCY MEDICINE | Admitting: EMERGENCY MEDICINE
Payer: COMMERCIAL

## 2018-02-02 ENCOUNTER — APPOINTMENT (OUTPATIENT)
Dept: GENERAL RADIOLOGY | Facility: CLINIC | Age: 38
End: 2018-02-02
Attending: EMERGENCY MEDICINE
Payer: COMMERCIAL

## 2018-02-02 VITALS
BODY MASS INDEX: 34.1 KG/M2 | SYSTOLIC BLOOD PRESSURE: 121 MMHG | DIASTOLIC BLOOD PRESSURE: 68 MMHG | RESPIRATION RATE: 18 BRPM | HEART RATE: 92 BPM | OXYGEN SATURATION: 97 % | WEIGHT: 225 LBS | HEIGHT: 68 IN

## 2018-02-02 DIAGNOSIS — R07.9 CHEST PAIN, UNSPECIFIED TYPE: ICD-10-CM

## 2018-02-02 DIAGNOSIS — L50.9 URTICARIA: ICD-10-CM

## 2018-02-02 LAB
ALBUMIN SERPL-MCNC: 4.1 G/DL (ref 3.4–5)
ALP SERPL-CCNC: 82 U/L (ref 40–150)
ALT SERPL W P-5'-P-CCNC: 53 U/L (ref 0–70)
ANION GAP SERPL CALCULATED.3IONS-SCNC: 8 MMOL/L (ref 3–14)
AST SERPL W P-5'-P-CCNC: 32 U/L (ref 0–45)
BASOPHILS # BLD AUTO: 0.1 10E9/L (ref 0–0.2)
BASOPHILS NFR BLD AUTO: 0.9 %
BILIRUB SERPL-MCNC: 0.6 MG/DL (ref 0.2–1.3)
BUN SERPL-MCNC: 13 MG/DL (ref 7–30)
CALCIUM SERPL-MCNC: 9.4 MG/DL (ref 8.5–10.1)
CHLORIDE SERPL-SCNC: 103 MMOL/L (ref 94–109)
CO2 SERPL-SCNC: 28 MMOL/L (ref 20–32)
CREAT SERPL-MCNC: 1.05 MG/DL (ref 0.66–1.25)
DIFFERENTIAL METHOD BLD: NORMAL
EOSINOPHIL # BLD AUTO: 0.1 10E9/L (ref 0–0.7)
EOSINOPHIL NFR BLD AUTO: 2 %
ERYTHROCYTE [DISTWIDTH] IN BLOOD BY AUTOMATED COUNT: 13.6 % (ref 10–15)
GFR SERPL CREATININE-BSD FRML MDRD: 79 ML/MIN/1.7M2
GLUCOSE BLDC GLUCOMTR-MCNC: 89 MG/DL (ref 70–99)
GLUCOSE SERPL-MCNC: 69 MG/DL (ref 70–99)
HCT VFR BLD AUTO: 41.7 % (ref 40–53)
HGB BLD-MCNC: 13.7 G/DL (ref 13.3–17.7)
IMM GRANULOCYTES # BLD: 0 10E9/L (ref 0–0.4)
IMM GRANULOCYTES NFR BLD: 0.7 %
INTERPRETATION ECG - MUSE: NORMAL
LYMPHOCYTES # BLD AUTO: 1.8 10E9/L (ref 0.8–5.3)
LYMPHOCYTES NFR BLD AUTO: 33.3 %
MCH RBC QN AUTO: 27.8 PG (ref 26.5–33)
MCHC RBC AUTO-ENTMCNC: 32.9 G/DL (ref 31.5–36.5)
MCV RBC AUTO: 85 FL (ref 78–100)
MONOCYTES # BLD AUTO: 0.6 10E9/L (ref 0–1.3)
MONOCYTES NFR BLD AUTO: 10.1 %
NEUTROPHILS # BLD AUTO: 2.9 10E9/L (ref 1.6–8.3)
NEUTROPHILS NFR BLD AUTO: 53 %
NRBC # BLD AUTO: 0 10*3/UL
NRBC BLD AUTO-RTO: 0 /100
PLATELET # BLD AUTO: 269 10E9/L (ref 150–450)
POTASSIUM SERPL-SCNC: 3.4 MMOL/L (ref 3.4–5.3)
PROT SERPL-MCNC: 8.3 G/DL (ref 6.8–8.8)
RBC # BLD AUTO: 4.92 10E12/L (ref 4.4–5.9)
SODIUM SERPL-SCNC: 139 MMOL/L (ref 133–144)
TROPONIN I BLD-MCNC: 0 UG/L (ref 0–0.1)
TROPONIN I SERPL-MCNC: <0.015 UG/L (ref 0–0.04)
TROPONIN I SERPL-MCNC: <0.015 UG/L (ref 0–0.04)
WBC # BLD AUTO: 5.5 10E9/L (ref 4–11)

## 2018-02-02 PROCEDURE — 84484 ASSAY OF TROPONIN QUANT: CPT | Mod: 91 | Performed by: EMERGENCY MEDICINE

## 2018-02-02 PROCEDURE — 99285 EMERGENCY DEPT VISIT HI MDM: CPT | Mod: 25

## 2018-02-02 PROCEDURE — 85025 COMPLETE CBC W/AUTO DIFF WBC: CPT | Performed by: EMERGENCY MEDICINE

## 2018-02-02 PROCEDURE — 96374 THER/PROPH/DIAG INJ IV PUSH: CPT

## 2018-02-02 PROCEDURE — 00000146 ZZHCL STATISTIC GLUCOSE BY METER IP

## 2018-02-02 PROCEDURE — 25000132 ZZH RX MED GY IP 250 OP 250 PS 637: Performed by: EMERGENCY MEDICINE

## 2018-02-02 PROCEDURE — 84484 ASSAY OF TROPONIN QUANT: CPT

## 2018-02-02 PROCEDURE — 96375 TX/PRO/DX INJ NEW DRUG ADDON: CPT

## 2018-02-02 PROCEDURE — 25000128 H RX IP 250 OP 636: Performed by: EMERGENCY MEDICINE

## 2018-02-02 PROCEDURE — 80053 COMPREHEN METABOLIC PANEL: CPT | Performed by: EMERGENCY MEDICINE

## 2018-02-02 PROCEDURE — 96372 THER/PROPH/DIAG INJ SC/IM: CPT

## 2018-02-02 PROCEDURE — 93005 ELECTROCARDIOGRAM TRACING: CPT

## 2018-02-02 PROCEDURE — 71046 X-RAY EXAM CHEST 2 VIEWS: CPT

## 2018-02-02 PROCEDURE — 96361 HYDRATE IV INFUSION ADD-ON: CPT

## 2018-02-02 RX ORDER — METHYLPREDNISOLONE SODIUM SUCCINATE 125 MG/2ML
125 INJECTION, POWDER, LYOPHILIZED, FOR SOLUTION INTRAMUSCULAR; INTRAVENOUS ONCE
Status: COMPLETED | OUTPATIENT
Start: 2018-02-02 | End: 2018-02-02

## 2018-02-02 RX ORDER — EPINEPHRINE 1 MG/ML
0.3 INJECTION, SOLUTION, CONCENTRATE INTRAVENOUS ONCE
Status: COMPLETED | OUTPATIENT
Start: 2018-02-02 | End: 2018-02-02

## 2018-02-02 RX ORDER — NITROGLYCERIN 0.4 MG/1
0.4 TABLET SUBLINGUAL EVERY 5 MIN PRN
Status: DISCONTINUED | OUTPATIENT
Start: 2018-02-02 | End: 2018-02-02 | Stop reason: HOSPADM

## 2018-02-02 RX ORDER — DIPHENHYDRAMINE HCL 25 MG
25-50 TABLET ORAL EVERY 6 HOURS PRN
Qty: 56 TABLET | Refills: 0 | Status: SHIPPED | OUTPATIENT
Start: 2018-02-02

## 2018-02-02 RX ORDER — ASPIRIN 325 MG
325 TABLET ORAL ONCE
Status: COMPLETED | OUTPATIENT
Start: 2018-02-02 | End: 2018-02-02

## 2018-02-02 RX ORDER — DIPHENHYDRAMINE HYDROCHLORIDE 50 MG/ML
25 INJECTION INTRAMUSCULAR; INTRAVENOUS ONCE
Status: COMPLETED | OUTPATIENT
Start: 2018-02-02 | End: 2018-02-02

## 2018-02-02 RX ORDER — PREDNISONE 20 MG/1
TABLET ORAL
Qty: 10 TABLET | Refills: 0 | Status: SHIPPED | OUTPATIENT
Start: 2018-02-02 | End: 2022-03-21

## 2018-02-02 RX ADMIN — METHYLPREDNISOLONE SODIUM SUCCINATE 125 MG: 125 INJECTION, POWDER, FOR SOLUTION INTRAMUSCULAR; INTRAVENOUS at 11:20

## 2018-02-02 RX ADMIN — ASPIRIN 325 MG ORAL TABLET 325 MG: 325 PILL ORAL at 11:23

## 2018-02-02 RX ADMIN — EPINEPHRINE 0.3 MG: 1 INJECTION INTRAMUSCULAR; INTRAVENOUS; SUBCUTANEOUS at 11:21

## 2018-02-02 RX ADMIN — NITROGLYCERIN 0.4 MG: 0.4 TABLET SUBLINGUAL at 11:31

## 2018-02-02 RX ADMIN — RANITIDINE HYDROCHLORIDE 50 MG: 25 INJECTION INTRAMUSCULAR; INTRAVENOUS at 11:17

## 2018-02-02 RX ADMIN — NITROGLYCERIN 0.4 MG: 0.4 TABLET SUBLINGUAL at 11:25

## 2018-02-02 RX ADMIN — SODIUM CHLORIDE 1000 ML: 9 INJECTION, SOLUTION INTRAVENOUS at 11:28

## 2018-02-02 RX ADMIN — DIPHENHYDRAMINE HYDROCHLORIDE 25 MG: 50 INJECTION, SOLUTION INTRAMUSCULAR; INTRAVENOUS at 11:17

## 2018-02-02 ASSESSMENT — ENCOUNTER SYMPTOMS
LIGHT-HEADEDNESS: 0
SHORTNESS OF BREATH: 1
NAUSEA: 1
VOMITING: 0
DIAPHORESIS: 1

## 2018-02-02 NOTE — ED PROVIDER NOTES
"  History     Chief Complaint:  Chest Pain    The history is provided by the patient.      Andrae Ruelas is a 37 year old male with history of GERD and psoriasis who presents to the emergency department today for evaluation of chest pain. The patient reports around 20 minutes prior to arrival the patient had a sudden onset of midsternal chest pain, describing the pain as \"punching\" in nature. He states he felt diaphoretic when the chest pain started, but denies any lightheadedness. He reports prior to the chest pain coming on he was having some indigestion. He was not exerting himself physically when the chest pain started. The pain is midsternal and in his epigastrium. Additionally the patient reports diffuse hives to his torso and shortness of breath. The patient states that he previously was told that he was \"allergic to exercise\" and that he would break out into hives from exercising.    Allergies:  No Known Drug Allergies      Medications:    naproxen (NAPROSYN) 500 MG tablet  omeprazole (PRILOSEC) 40 MG capsule    Past Medical History:    Displacement of intervertebral disc  GERD  Psoriasis     Past Surgical History:    Myringotomy   Open reduction fixation ankle     Family History:    CAD  Breast cancer     Social History:  The patient was accompanied to the ED by himself.  Smoking Status: Former smoker  Smokeless Tobacco: Never used   Alcohol Use: No    Marital Status:        Review of Systems   Constitutional: Positive for diaphoresis.   Respiratory: Positive for shortness of breath.    Cardiovascular: Positive for chest pain.   Gastrointestinal: Positive for nausea. Negative for vomiting.   Skin: Positive for rash (diffuse hives).   Neurological: Negative for light-headedness.   All other systems reviewed and are negative.     Physical Exam     Patient Vitals for the past 24 hrs:   BP Pulse Heart Rate Resp SpO2 Height Weight   02/02/18 1230 119/69 - 77 16 97 % - -   02/02/18 1215 127/70 - 99 " "24 97 % - -   02/02/18 1154 - - 84 15 96 % - -   02/02/18 1145 128/77 - 80 16 94 % - -   02/02/18 1130 145/86 - - - - - -   02/02/18 1115 (!) 143/91 - 91 12 98 % - -   02/02/18 1111 (!) 160/95 - - - - - -   02/02/18 1108 - 92 92 18 100 % 1.727 m (5' 8\") 102.1 kg (225 lb)      Physical Exam   Constitutional: Patient appears well-developed and well-nourished. There is mild distress.   Head: No external signs of trauma noted.  Neck: No JVD noted. No stridor noted.  Eyes: Pupils are equal, round, and reactive to light.   Oropharynx: Uvula midline. No swelling. No erythema. No swelling of the lips, tongue, or floor of the mouth.  Cardiovascular: Normal rate, regular rhythm and normal heart sounds.  Exam reveals no gallop and no friction rub.  No murmur heard. Normal peripheral pulses.  Pulmonary/Chest: Effort normal and breath sounds normal. No respiratory distress. Patient has no wheezes. Patient has no rales.   Abdominal: Soft. There is no tenderness.   Extremities: No edema noted  Neurological: Patient is alert and oriented to person, place, and time.   Skin: Skin is warm and dry. There is no diaphoresis noted. There is erythema and hives noted on the anterior chest, right belt line, and neck.    Emergency Department Course     ECG:  Indication: Chest pain   Completed at 1108.  Read at 1108.   Normal sinus rhythm. Normal ECG.   Rate 91 bpm. IN interval 168. QRS duration 102. QT/QTc 354/449. P-R-T axes 43 76 49.     Imaging:  Radiology findings were communicated with the patient who voiced understanding of the findings.    Chest XR, PA & LAT:  IMPRESSION: No acute cardiopulmonary abnormality  Report per radiology       Laboratory:  Laboratory findings were communicated with the patient who voiced understanding of the findings.    Glucose by meter: 89   Troponin POCT: 0.00    CBC: WBC 5.5, HGB 13.7,   CMP: Glucose 69 (L), o/w WNL. (Creatinine 1.05)     1. Troponin (Collected 1105): <0.015   2. Repeat troponin: " Pending    Interventions:  1117 Benadryl 25mg IV    1117 Zantac 50mg IV   1120 Solu-medrol 125mg IV   1121 Epinephrine 0.3mg subcutaneous   1125 Nitroglycerin 0.4mg sublingual  1128 NS Bolus 1,000mL IV    1131 Nitroglycerin 0.4mg sublingual      Emergency Department Course:  Nursing notes and vitals reviewed.  1115 I performed an exam of the patient as documented above.   EKG obtained in the ED, see results above.    IV was inserted and blood was drawn for laboratory testing, results above.   The patient was sent for a chest x-ray while in the emergency department, results above.    1240 I rechecked the patient and updated him on his x-ray and lab results.   I personally reviewed the laboratory and imaging results with the Patient and answered all related questions prior to sign out.   Impression & Plan      Medical Decision Making:  Andrae Ruelas is a 37 year old male presented to the Emergency Department with a complaint of chest pain. Fortunately the workup in the ED has been unremarkable. The EKG shows NSR. The troponin is negative, and the patient's HEART Score is 2. Given these findings, he is low risk for a major cardiac event at 6 weeks.   I considered other possible causes of chest pain including PE, infection, pneumothorax, aortic dissection, and even more benign causes such as reflux and esophageal motility issues. He also had a rash with hives that has completely resolved after Epi/Benadryl/Solu-Medrol/Benadryl. He also received NTG SL, but I am not sure if this or the aforementioned medication was the reason for the symptom improvement. He notes previous history of rashes and has seen an allergist. His symptoms were non-exertional. It is possible this was anaphylaxis. The patient's 3 hour tropon is negative. I believe he is able to be discharged, though I will encourage close follow up in the outpatient setting. Anticipatory guidance given prior to discharge.      Diagnosis:    ICD-10-CM    1.  Urticaria L50.9    2. Chest pain, unspecified type R07.9        Disposition:  Discharged    New Prescriptions    DIPHENHYDRAMINE (BENADRYL) 25 MG TABLET    Take 1-2 tablets (25-50 mg) by mouth every 6 hours as needed for itching    PREDNISONE (DELTASONE) 20 MG TABLET    Take two tablets (= 40mg) each day for 5 (five) days         Scribe Disclosure:  I, Vini Kelly, am serving as a scribe at 11:15 AM on 2/2/2018 to document services personally performed by Rolan Taylor DO based on my observations and the provider's statements to me.    2/2/2018   [facility] EMERGENCY DEPARTMENT       Rolan Taylor DO  02/02/18 3740

## 2018-02-02 NOTE — ED AVS SNAPSHOT
[facility] Emergency Department    [address]    [facility] [address]    Phone:  [phone]    Fax:  [phone]                                       Andrae Ruelas   MRN: [id number]    Department:  [facility] Emergency Department   Date of Visit:  2/2/2018           After Visit Summary Signature Page     I have received my discharge instructions, and my questions have been answered. I have discussed any challenges I see with this plan with the nurse or doctor.    ..........................................................................................................................................  Patient/Patient Representative Signature      ..........................................................................................................................................  Patient Representative Print Name and Relationship to Patient    ..................................................               ................................................  Date                                            Time    ..........................................................................................................................................  Reviewed by Signature/Title    ...................................................              ..............................................  Date                                                            Time

## 2018-02-02 NOTE — ED NOTES
"Sitting at his desk. 20 mins ago, thought he was having indigestion but pain kept getting worse. Pain midsternal, feels like someone is \"punching him.\" SOB and nausea. Never had this pain in the past.   "

## 2018-02-02 NOTE — ED NOTES
Patient resting quietly in bed.  No sign of distress.  Call light within reach.  Family at bedside.  Rates pain 2/10.  Will continue to monitor.

## 2018-02-02 NOTE — DISCHARGE INSTRUCTIONS
*CHEST PAIN, UNCERTAIN CAUSE    Based on your exam today, the exact cause of your chest pain is not certain. Your condition does not seem serious at this time, and your pain does not appear to be coming from your heart. However, sometimes the signs of a serious problem take more time to appear. Therefore, watch for the warning signs listed below.  HOME CARE:  1. Rest today and avoid strenuous activity.  2. Take any prescribed medicine as directed.  FOLLOW UP with your doctor in 1-3 days.   GET PROMPT MEDICAL ATTENTION if any of the following occur:    A change in the type of pain: if it feels different, becomes more severe, lasts longer, or begins to spread into your shoulder, arm, neck, jaw or back    Shortness of breath or increased pain with breathing    Weakness, dizziness, or fainting    Cough with blood or dark colored sputum (phlegm)    Fever over 101  F (38.3  C)    Swelling, pain or redness in one leg    4262-4273 [employer]. [address] [place] [address]. All rights reserved. This information is not intended as a substitute for professional medical care. Always follow your healthcare professional's instructions.  This information has been modified by your health care provider with permission from the publisher.    General Allergic Reactions  An allergic reaction is a set of symptoms caused by an allergen. An allergen is something that causes a person s immune system to react. When a person comes in contact with an allergen, it causes the body to release chemicals. These include the chemical histamine. Histamine causes swelling and itching. It may affect the entire body. This is called a general allergic reaction. Often symptoms affect only 1 part of the body. This is called a local allergic reaction.  You are having an allergic reaction. Almost anything can cause one. Different people are allergic to different things. It is usually something that you ate or swallowed, came  into contact with by getting or putting it on your skin or clothes, or something you breathed in the air. This can be very annoying and sometimes scary.  Most of us think of allergic reactions when we have a rash or itchy skin. Symptoms can include:    Itching of the eyes, nose, and roof of the mouth    Runny or stuffy nose    Watery eyes     Sneezing or coughing     A blocked feeling in the ear    Red, itchy rash called hives    Red and purple spots    Rash, redness, welts, blisters    Itching, burning, stinging, pain    Dry, flaky, cracking, scaly skin  Severe symptoms include:    Swelling of the face, lips, or other parts of the body    Hoarse voice    Trouble swallowing, feeling like your throat is closing    Trouble breathing, wheezing    Nausea, vomiting, diarrhea, stomach cramps    Feeling faint or lightheaded, rapid heart rate  Sometimes the cause may be obvious. But there are so many things that can cause a reaction that you may not be able to figure out. The most important things to help find your allergen are:    Remembering when it started    What you were doing at the time or just before that    Any activities you were involved in    Any new products or contacts  Below are some common causes. But remember that almost anything can cause a reaction. You may not even be aware that you came into contact with one of these things:    Dust, mold, pollen    Plants (common ones are poison ivy and poison oak, but there are many others)     Animals    Foods such as shrimp, shellfish, peanuts, milk products, gluten, and eggs. Also food colorings, flavorings, and additives.    Insect bites or stings such as bees, mosquitos, fleas, ticks    Medicines such as penicillin, sulfa medicines, amoxicillin, aspirin, and ibuprofen. But any medicine can cause a reaction.    Jewelry such as nickel or gold. This can be new, or something you ve worn for a while, including zippers and buttons.    Latex such as in gloves, clothes,  toys, balloons, or some tapes. Some people allergic to latex may also have problems with foods like bananas, avocados, kiwi, papaya, or chestnuts.    Lotions, perfumes, cosmetics, soaps, shampoos, skincare products, nail products    Chemicals or dyes in clothing, linen, , hair dyes, soaps, iodine  Many viruses and common colds can cause a rash that is not an allergic reaction. Sometimes it is hard to tell the difference between allergies, sensitivity, or an intolerance to something. This is especially true with food. Many things can cause diarrhea, vomiting, stomach cramps, and skin irritation.  Home care    The goal of treatment is to help relieve the symptoms and get you feeling better. The rash will usually fade over several days. But it can sometimes last a couple of weeks. Over the next couple of days, there may be times when it is gets a little worse, and then better again. Here are some things to do:    If you know what you are allergic to, stay away from it. Future reactions could be worse than this one.    Avoid tight clothing and anything that heats up your skin (hot showers or baths, direct sunlight). Heat will make itching worse.    An ice pack will relieve local areas of intense itching and redness. To make an ice pack, put ice cubes in a plastic bag that seals at the top. Wrap it in a thin, clean towel. Don t put the ice directly on the skin because it can damage the skin.    Oral diphenhydramine is an over-the-counter antihistamine sold at pharmacy and grocery stores. Unless a prescription antihistamine was given, diphenhydramine may be used to reduce itching if large areas of the skin are involved. It may make you sleepy. So be careful using it in the daytime or when going to school, working, or driving. Note: Don t use diphenhydramine if you have glaucoma or if you are a man with trouble urinating due to an enlarged prostate. There are other antihistamines that won t make you so sleepy.  These are good choices for daytime use. Ask your pharmacist for suggestions.    Don t use diphenhydramine cream on your skin. It can cause a further reaction in some people.    To help prevent an infection, don't scratch the affected area. Scratching may worsen the reaction and damage your skin. It can also lead to an infection. Always check the affected for signs of an infection.    Call your healthcare provider and ask what you can use to help decrease the itching.    To decrease allergic reactions, try the following:      Use heat-steam to clean your home    Use high-efficiency particulate (HEPA) vacuums and filters    Stay away from food and pet triggers    Kill any cockroaches    Clean your house often  Follow-up care  Follow up with your healthcare provider, or as advised. If you had a severe reaction today, or if you have had several mild to medium allergic reactions in the past, ask your provider about allergy testing. This can help you find out what you are allergic to. If your reaction included dizziness, fainting, or trouble breathing or swallowing, ask your provider about carrying auto-injectable epinephrine.  Call 911  Call 911 if any of these occur:    Trouble breathing or swallowing, wheezing    Cool, moist, pale skin    Shortness of breath    Hoarse voice or trouble speaking    Confused     Very drowsy or trouble awakening    Fainting or loss of consciousness    Rapid heart rate    Feeling of dizziness or weakness or a sudden drop in blood pressure    Feeling of doom    Feeling lightheaded    Severe nausea or vomiting, or diarrhea    Seizure    Swelling in the face, eyelids, lips, mouth, throat or tongue    Drooling  When to seek medical advice  Call your healthcare provider right away if any of these occur:    Spreading areas of itching, redness or swelling    Nausea or stomach cramps or abdominal pain    Continuing or recurring symptoms    Spreading areas of redness, swelling, or itching    Signs  of infection at the affected site:    Spreading redness    Increased pain or swelling    Fluid or colored drainage from the site    Fever of 100.4 F (38 C) or above lasting for 24 to 48 hours, or as directed by your provider  Date Last Reviewed: 3/1/2017    5383-5293 [employer]. [address] [place] [address]. All rights reserved. This information is not intended as a substitute for professional medical care. Always follow your healthcare professional's instructions.

## 2019-08-18 ENCOUNTER — HOSPITAL ENCOUNTER (EMERGENCY)
Facility: CLINIC | Age: 39
Discharge: HOME | End: 2019-08-18
Attending: PHYSICIAN ASSISTANT | Admitting: PHYSICIAN ASSISTANT
Payer: COMMERCIAL

## 2019-08-18 ENCOUNTER — APPOINTMENT (OUTPATIENT)
Dept: CT IMAGING | Facility: CLINIC | Age: 39
End: 2019-08-18
Attending: NURSE PRACTITIONER
Payer: COMMERCIAL

## 2019-08-18 VITALS
TEMPERATURE: 98.4 F | SYSTOLIC BLOOD PRESSURE: 137 MMHG | HEART RATE: 75 BPM | DIASTOLIC BLOOD PRESSURE: 85 MMHG | RESPIRATION RATE: 18 BRPM | OXYGEN SATURATION: 98 %

## 2019-08-18 DIAGNOSIS — R10.13 ABDOMINAL PAIN, EPIGASTRIC: ICD-10-CM

## 2019-08-18 LAB
ALBUMIN SERPL-MCNC: 3.5 G/DL (ref 3.4–5)
ALBUMIN UR-MCNC: NEGATIVE MG/DL
ALP SERPL-CCNC: 97 U/L (ref 40–150)
ALT SERPL W P-5'-P-CCNC: 39 U/L (ref 0–70)
ANION GAP SERPL CALCULATED.3IONS-SCNC: 6 MMOL/L (ref 3–14)
APPEARANCE UR: CLEAR
AST SERPL W P-5'-P-CCNC: 20 U/L (ref 0–45)
BASOPHILS # BLD AUTO: 0 10E9/L (ref 0–0.2)
BASOPHILS NFR BLD AUTO: 0.4 %
BILIRUB SERPL-MCNC: 0.5 MG/DL (ref 0.2–1.3)
BILIRUB UR QL STRIP: NEGATIVE
BUN SERPL-MCNC: 11 MG/DL (ref 7–30)
CALCIUM SERPL-MCNC: 9 MG/DL (ref 8.5–10.1)
CHLORIDE SERPL-SCNC: 106 MMOL/L (ref 94–109)
CO2 SERPL-SCNC: 28 MMOL/L (ref 20–32)
COLOR UR AUTO: ABNORMAL
CREAT SERPL-MCNC: 1 MG/DL (ref 0.66–1.25)
DIFFERENTIAL METHOD BLD: ABNORMAL
EOSINOPHIL # BLD AUTO: 0.1 10E9/L (ref 0–0.7)
EOSINOPHIL NFR BLD AUTO: 2.5 %
ERYTHROCYTE [DISTWIDTH] IN BLOOD BY AUTOMATED COUNT: 14.6 % (ref 10–15)
GFR SERPL CREATININE-BSD FRML MDRD: >90 ML/MIN/{1.73_M2}
GLUCOSE SERPL-MCNC: 94 MG/DL (ref 70–99)
GLUCOSE UR STRIP-MCNC: NEGATIVE MG/DL
HCT VFR BLD AUTO: 39.7 % (ref 40–53)
HGB BLD-MCNC: 12.7 G/DL (ref 13.3–17.7)
HGB UR QL STRIP: NEGATIVE
IMM GRANULOCYTES # BLD: 0 10E9/L (ref 0–0.4)
IMM GRANULOCYTES NFR BLD: 0.2 %
KETONES UR STRIP-MCNC: NEGATIVE MG/DL
LEUKOCYTE ESTERASE UR QL STRIP: NEGATIVE
LIPASE SERPL-CCNC: 188 U/L (ref 73–393)
LYMPHOCYTES # BLD AUTO: 1.7 10E9/L (ref 0.8–5.3)
LYMPHOCYTES NFR BLD AUTO: 35.7 %
MCH RBC QN AUTO: 27.4 PG (ref 26.5–33)
MCHC RBC AUTO-ENTMCNC: 32 G/DL (ref 31.5–36.5)
MCV RBC AUTO: 86 FL (ref 78–100)
MONOCYTES # BLD AUTO: 0.6 10E9/L (ref 0–1.3)
MONOCYTES NFR BLD AUTO: 12.1 %
MUCOUS THREADS #/AREA URNS LPF: PRESENT /LPF
NEUTROPHILS # BLD AUTO: 2.3 10E9/L (ref 1.6–8.3)
NEUTROPHILS NFR BLD AUTO: 49.1 %
NITRATE UR QL: NEGATIVE
NRBC # BLD AUTO: 0 10*3/UL
NRBC BLD AUTO-RTO: 0 /100
PH UR STRIP: 6 PH (ref 5–7)
PLATELET # BLD AUTO: 217 10E9/L (ref 150–450)
POTASSIUM SERPL-SCNC: 3.5 MMOL/L (ref 3.4–5.3)
PROT SERPL-MCNC: 7.7 G/DL (ref 6.8–8.8)
RBC # BLD AUTO: 4.63 10E12/L (ref 4.4–5.9)
RBC #/AREA URNS AUTO: 0 /HPF (ref 0–2)
SODIUM SERPL-SCNC: 140 MMOL/L (ref 133–144)
SOURCE: ABNORMAL
SP GR UR STRIP: 1.01 (ref 1–1.03)
UROBILINOGEN UR STRIP-MCNC: NORMAL MG/DL (ref 0–2)
WBC # BLD AUTO: 4.7 10E9/L (ref 4–11)
WBC #/AREA URNS AUTO: <1 /HPF (ref 0–5)

## 2019-08-18 PROCEDURE — 74177 CT ABD & PELVIS W/CONTRAST: CPT

## 2019-08-18 PROCEDURE — 25000132 ZZH RX MED GY IP 250 OP 250 PS 637: Performed by: NURSE PRACTITIONER

## 2019-08-18 PROCEDURE — 83690 ASSAY OF LIPASE: CPT | Performed by: PHYSICIAN ASSISTANT

## 2019-08-18 PROCEDURE — 80053 COMPREHEN METABOLIC PANEL: CPT | Performed by: PHYSICIAN ASSISTANT

## 2019-08-18 PROCEDURE — 25000128 H RX IP 250 OP 636: Performed by: NURSE PRACTITIONER

## 2019-08-18 PROCEDURE — 99285 EMERGENCY DEPT VISIT HI MDM: CPT | Mod: 25

## 2019-08-18 PROCEDURE — 93005 ELECTROCARDIOGRAM TRACING: CPT

## 2019-08-18 PROCEDURE — 85025 COMPLETE CBC W/AUTO DIFF WBC: CPT | Performed by: PHYSICIAN ASSISTANT

## 2019-08-18 PROCEDURE — 96374 THER/PROPH/DIAG INJ IV PUSH: CPT | Mod: 59

## 2019-08-18 PROCEDURE — 81001 URINALYSIS AUTO W/SCOPE: CPT | Performed by: PHYSICIAN ASSISTANT

## 2019-08-18 PROCEDURE — 96361 HYDRATE IV INFUSION ADD-ON: CPT

## 2019-08-18 RX ORDER — KETOROLAC TROMETHAMINE 15 MG/ML
15 INJECTION, SOLUTION INTRAMUSCULAR; INTRAVENOUS ONCE
Status: COMPLETED | OUTPATIENT
Start: 2019-08-18 | End: 2019-08-18

## 2019-08-18 RX ORDER — IOPAMIDOL 755 MG/ML
500 INJECTION, SOLUTION INTRAVASCULAR ONCE
Status: COMPLETED | OUTPATIENT
Start: 2019-08-18 | End: 2019-08-18

## 2019-08-18 RX ORDER — SUCRALFATE ORAL 1 G/10ML
1 SUSPENSION ORAL 4 TIMES DAILY
Qty: 280 ML | Refills: 0 | Status: SHIPPED | OUTPATIENT
Start: 2019-08-18 | End: 2019-08-25

## 2019-08-18 RX ORDER — SUCRALFATE ORAL 1 G/10ML
1 SUSPENSION ORAL ONCE
Status: COMPLETED | OUTPATIENT
Start: 2019-08-18 | End: 2019-08-18

## 2019-08-18 RX ADMIN — IOPAMIDOL 100 ML: 755 INJECTION, SOLUTION INTRAVENOUS at 18:59

## 2019-08-18 RX ADMIN — SODIUM CHLORIDE 65 ML: 9 INJECTION, SOLUTION INTRAVENOUS at 19:00

## 2019-08-18 RX ADMIN — KETOROLAC TROMETHAMINE 15 MG: 15 INJECTION, SOLUTION INTRAMUSCULAR; INTRAVENOUS at 18:38

## 2019-08-18 RX ADMIN — SODIUM CHLORIDE 1000 ML: 9 INJECTION, SOLUTION INTRAVENOUS at 18:13

## 2019-08-18 RX ADMIN — SUCRALFATE 1 G: 1 SUSPENSION ORAL at 18:47

## 2019-08-18 ASSESSMENT — ENCOUNTER SYMPTOMS
FEVER: 0
NAUSEA: 1
BACK PAIN: 0
FLANK PAIN: 0
VOMITING: 0
SHORTNESS OF BREATH: 0
CHILLS: 0
DYSURIA: 0
CONSTIPATION: 0
FREQUENCY: 0
ABDOMINAL PAIN: 1

## 2019-08-18 NOTE — ED TRIAGE NOTES
"Patient presents to ED after being sent from  for abdominal pain. Pain 3/10, laying flat 8/10. Denies nausea, vomiting, or diarrhea. Pain upper mid gastric area. Patient had labs and x-ray at clinic that all \"looked normal\". SOB noted, deep breaths makes pain worse. Denies chest pain but states tingling up shoulder into arm.  "

## 2019-08-18 NOTE — ED PROVIDER NOTES
History     Chief Complaint:  Abdominal Pain    HPI   Andrae Ruelas is a 38 year old male, with history of GERD, who presents alone for evaluation of epigastric and left sided abdominal pain for the last 4 days worsening last night at 2100. Patient states that it primarily stayed in the upper mid gastric region, but then moved to the left side of his abdomen. He states that the first episode began 4 days ago and stated that it resolved after a few hours with the use of Tums. Patient reports that he attempted the use of Tums last night, though only having resolution for 20 minutes prior to return of the pain. He states that he was unable to sleep secondary to this pain, thus prompting him to present to , which he had the below work up, but referred to the ED for further testing.     Here in the ED, he states that pain is most notable with movement and is somewhat exacerbated with eating, thus has had a decreased appetite. He states that he also feels a left sided abdominal tingling sensation, but denies any nausea, vomiting, diarrhea, constipation, urinary symptoms or chest pain. He notes that he did have imaging performed 7 years ago that showed noted intussusception on left side, but denies any recent trauma or injury. He denies any history of abdominal surgeries.     Work Up from  Visit 8/18/19:  XR Abdomen:   FINDINGS: Scattered gas and moderate amount of formed stool material within normal caliber colon. No mechanical obstruction or free gas. No opaque urinary tract calculi. Anatomic alignment of the spine, bones and joints of the pelvis. The urinary bladder is distended. Pelvic phleboliths.    CBC: AWNL (WBC 5.2, HGB 13.6, )     Allergies:  No Known Drug Allergies      Medications:    Flexeril  Benadryl  Prilosec  Deltasone    Past Medical History:    Displacement of intervertebral disc  GERD  Psoriasis    Past Surgical History:    Myringotomy, insert tube bilateral, combined  ORIF ankle -  right    Family History:    Brother - CAD  Mother - colorectal cancer, breast cancer    Social History:  The patient was accompanied to the ED alone.  Smoking Status: Former  Smokeless Tobacco: No  Alcohol Use: Former  Drug Use: No   Marital Status:   [2]     Review of Systems   Constitutional: Negative for chills and fever.   Respiratory: Negative for shortness of breath.    Cardiovascular: Negative for chest pain.   Gastrointestinal: Positive for abdominal pain and nausea. Negative for constipation and vomiting.   Genitourinary: Negative for dysuria, flank pain and frequency.   Musculoskeletal: Negative for back pain.   Skin: Negative for rash.   All other systems reviewed and are negative.      Physical Exam   Vitals:  Patient Vitals for the past 24 hrs:   BP Temp Temp src Pulse Resp SpO2   08/18/19 1830 137/85 -- -- 75 -- 98 %   08/18/19 1800 129/77 -- -- 73 -- 95 %   08/18/19 1611 (!) 146/102 98.4  F (36.9  C) Temporal 96 18 96 %      Physical Exam  General: Alert, No obvious discomfort, well kept  Eyes: PERRL, conjunctivae pink no scleral icterus or conjunctival injection  ENT:   Moist mucus membranes, posterior oropharynx clear without erythema or exudates, No lymphadenopathy, Normal voice  Resp:  Lungs clear to auscultation bilaterally, no crackles/rubs/wheezes. Good air movement  CV:  Normal rate and rhythm, no murmurs/rubs/gallops  GI:  Abdomen soft and non-distended.  Normoactive BS.  Mid epigastric and right lower quadrant tenderness, No guarding or rebound, No masses  Skin:  Warm, dry.  No rashes or petechiae  Musculoskeletal: No peripheral edema or calf tenderness, Normal gross ROM   Neuro: Alert and oriented to person/place/time, normal sensation  Psychiatric: Normal affect, cooperative, good eye contact  Emergency Department Course     Imaging:  Radiology findings were communicated with the patient who voiced understanding of the findings.  CT Abdomen Pelvis w Contrast:  CONCLUSION:   1.  No  acute abnormality.  2.  Fatty liver.  Reading per radiology.     Laboratory:  Laboratory findings were communicated with the patient who voiced understanding of the findings.  CBC: Hgb 12.7 (L) o/w WNL (WBC 4.7, )  CMP: AWNL (Creatinine 1.00)  Lipase: 188    UA with Microscopic: Mucous Urine Present (A) o/w WNL     Interventions:  1813 0.9% NaCl Bolus 1000 mL IV  1838 Toradol 15 mg IV  1847 Carafate 1 g PO     Emergency Department Course:  Nursing notes and vitals reviewed.  IV was inserted and blood was drawn for laboratory testing, results above.  The patient provided a urine sample here in the emergency department. This was sent for laboratory testing, findings above.  The patient was sent for a CT Abdomen Pelvis w Contrast while in the emergency department, results above.      (1822)   I performed an exam of the patient as documented above. History obtained from patient.     (2021)   Updated patient regarding results. Discussed plan of care and patient will be discharged.     I discussed the treatment plan with the patient. They expressed understanding of this plan and consented to discharge. They will be discharged home with instructions for care and follow up. In addition, the patient will return to the emergency department if their symptoms persist, worsen, if new symptoms arise or if there is any concern.  All questions were answered.     I personally reviewed the laboratory results with the Patient and answered all related questions prior to discharge.    Impression & Plan      Medical Decision Making:  Andrae Ruelas presents with abdominal pain.  The differential diagnosis is broad and includes:  Appendicitis, cholecystitis, peptic ulcer disease, diverticulitis, bowel obstruction, ischemia, pancreatitis, amongst others.  Based on clinical exam, laboratory testing, and imaging, no significant etiologies were found.  The pain has improved with interventions in the ED.  The exact etiology of the  pain is not clear at this time.  He will be discharged, and was warned that persistent or worsening symptoms should prompt re-examination (ED if necessary) in 8-12 hours.     Diagnosis:    ICD-10-CM    1. Abdominal pain, epigastric R10.13         Disposition:   Discharged.     Discharge Medications:     Medication List      Started    sucralfate 1 GM/10ML suspension  Commonly known as:  CARAFATE  1 g, Oral, 4 TIMES DAILY            Scribe Disclosure:  I, Ysabel Jack, am serving as a scribe at 6:41 PM on 8/18/2019 to document services personally performed by Samuel Caicedo APRN*, based on my observations and the provider's statements to me.  8/18/2019   [facility] EMERGENCY DEPARTMENT       Samuel Caicedo APRN CNP  08/18/19 2039

## 2019-08-18 NOTE — ED AVS SNAPSHOT
[facility] Emergency Department  [address]  [facility] [address]  Phone:  [phone]  Fax:  [phone]                                    Andrae Ruelas   MRN: [id number]    Department:  [facility] Emergency Department   Date of Visit:  8/18/2019           After Visit Summary Signature Page    I have received my discharge instructions, and my questions have been answered. I have discussed any challenges I see with this plan with the nurse or doctor.    ..........................................................................................................................................  Patient/Patient Representative Signature      ..........................................................................................................................................  Patient Representative Print Name and Relationship to Patient    ..................................................               ................................................  Date                                   Time    ..........................................................................................................................................  Reviewed by Signature/Title    ...................................................              ..............................................  Date                                               Time          22EPIC Rev 08/18

## 2019-08-19 LAB — INTERPRETATION ECG - MUSE: NORMAL

## 2019-08-19 NOTE — DISCHARGE INSTRUCTIONS
Take either Zantac or omeprazole daily for the next at least 1 to 2 weeks if not for the next month.  Eat a bland diet.  Follow-up with gastroenterology as we discussed.

## 2020-03-01 ENCOUNTER — HEALTH MAINTENANCE LETTER (OUTPATIENT)
Age: 40
End: 2020-03-01

## 2020-12-14 ENCOUNTER — HEALTH MAINTENANCE LETTER (OUTPATIENT)
Age: 40
End: 2020-12-14

## 2021-03-17 ENCOUNTER — HOSPITAL ENCOUNTER (EMERGENCY)
Facility: CLINIC | Age: 41
Discharge: HOME | End: 2021-03-17
Attending: EMERGENCY MEDICINE | Admitting: EMERGENCY MEDICINE
Payer: OTHER MISCELLANEOUS

## 2021-03-17 VITALS
HEART RATE: 95 BPM | TEMPERATURE: 99.2 F | OXYGEN SATURATION: 99 % | SYSTOLIC BLOOD PRESSURE: 151 MMHG | RESPIRATION RATE: 20 BRPM | HEIGHT: 72 IN | DIASTOLIC BLOOD PRESSURE: 114 MMHG | BODY MASS INDEX: 33.86 KG/M2 | WEIGHT: 250 LBS

## 2021-03-17 DIAGNOSIS — S61.412A LACERATION OF LEFT HAND WITHOUT FOREIGN BODY, INITIAL ENCOUNTER: ICD-10-CM

## 2021-03-17 PROCEDURE — 272N000047 HC ADHESIVE DERMABOND SKIN

## 2021-03-17 PROCEDURE — 90715 TDAP VACCINE 7 YRS/> IM: CPT | Performed by: EMERGENCY MEDICINE

## 2021-03-17 PROCEDURE — 99283 EMERGENCY DEPT VISIT LOW MDM: CPT | Mod: 25

## 2021-03-17 PROCEDURE — 250N000011 HC RX IP 250 OP 636: Performed by: EMERGENCY MEDICINE

## 2021-03-17 PROCEDURE — 12001 RPR S/N/AX/GEN/TRNK 2.5CM/<: CPT

## 2021-03-17 PROCEDURE — 90471 IMMUNIZATION ADMIN: CPT | Performed by: EMERGENCY MEDICINE

## 2021-03-17 RX ORDER — LIDOCAINE HYDROCHLORIDE AND EPINEPHRINE 10; 10 MG/ML; UG/ML
INJECTION, SOLUTION INFILTRATION; PERINEURAL
Status: DISCONTINUED
Start: 2021-03-17 | End: 2021-03-17 | Stop reason: WASHOUT

## 2021-03-17 RX ADMIN — CLOSTRIDIUM TETANI TOXOID ANTIGEN (FORMALDEHYDE INACTIVATED), CORYNEBACTERIUM DIPHTHERIAE TOXOID ANTIGEN (FORMALDEHYDE INACTIVATED), BORDETELLA PERTUSSIS TOXOID ANTIGEN (GLUTARALDEHYDE INACTIVATED), BORDETELLA PERTUSSIS FILAMENTOUS HEMAGGLUTININ ANTIGEN (FORMALDEHYDE INACTIVATED), BORDETELLA PERTUSSIS PERTACTIN ANTIGEN, AND BORDETELLA PERTUSSIS FIMBRIAE 2/3 ANTIGEN 0.5 ML: 5; 2; 2.5; 5; 3; 5 INJECTION, SUSPENSION INTRAMUSCULAR at 14:43

## 2021-03-17 ASSESSMENT — ENCOUNTER SYMPTOMS
WEAKNESS: 0
WOUND: 1
VOMITING: 0
CHILLS: 0
FEVER: 0
NUMBNESS: 0

## 2021-03-17 ASSESSMENT — MIFFLIN-ST. JEOR: SCORE: 2078.02

## 2021-03-17 NOTE — DISCHARGE INSTRUCTIONS
Updated your tetanus today  Watch for signs of infection  No ointment on the wound while the glue is there  Let the glue fall off on its own    Discharge Instructions  Laceration (Cut)    You were seen today for a laceration (cut).  Your provider examined your laceration for any problems such a buried foreign body (like glass, a splinter, or gravel), or injury to blood vessels, tendons, and nerves.  Your provider may have also rinsed and/or scrubbed your laceration to help prevent an infection. It may not be possible to find all problems with your laceration on the first visit; occasionally foreign bodies or a tendon injury can go undetected.    Your laceration may have been closed in one of several ways:  No closure: many wounds will heal just fine without closure.  Stitches: regular stitches that require removal.  Staples: skin staples are often used in the scalp/head.  Wound adhesive (glue): skin glue can be used for certain lacerations and doesn t require removal.  Wound strips (aka Butterfly bandages or steri-strips): these are bandages that help to close a wound.  Absorbable stitches:  dissolving  stitches that go away on their own and usually don t require removal.    A small percentage of wounds will develop an infection regardless of how well the wound is cared for. Antibiotics are generally not indicated to prevent an infection so are only given for a small number of high-risk wounds. Some lacerations are too high risk to close, and are left open to heal because closure can increase the likelihood that an infection will develop.    Remember that all lacerations, no matter how expertly repaired, will cause scarring. We consider many factors, techniques, and materials, in our efforts to provide the best possible cosmetic outcome.    Generally, every Emergency Department visit should have a follow-up clinic visit with either a primary or a specialty clinic/provider. Please follow-up as instructed by your  emergency provider today.     Return to the Emergency Department right away if:  You have more redness, swelling, pain, drainage (pus), a bad smell, or red streaking from your laceration as these symptoms could indicate an infection.  You have a fever of 100.4 F or more.  You have bleeding that you cannot stop at home. If your cut starts to bleed, hold pressure on the bleeding area with a clean cloth or put pressure over the bandage.  If the bleeding does not stop after using constant pressure for 30 minutes, you should return to the Emergency Department for further treatment.  An area past the laceration is cool, pale, or blue compared with the other side, or has a slower return of color when squeezed.  Your dressing seems too tight or starts to get uncomfortable or painful. For children, signs of a problem might be irritability or restlessness.  You have loss of normal function or use of an area, such as being unable to straighten or bend a finger normally.  You have a numb area past the laceration.    Return to the Emergency Department or see your regular provider if:  The laceration starts to come open.   You have something coming out of the cut or a feeling that there is something in the laceration.  Your wound will not heal, or keeps breaking open. There can always be glass, wood, dirt or other things in any wound.  They will not always show up, even on x-rays.  If a wound does not heal, this may be why, and it is important to follow-up with your regular provider.    Home Care:  Take your dressing off in 12-24 hours, or as instructed by your provider, to check your laceration. Remove the dressing sooner if it seems too tight or painful, or if it is getting numb, tingly, or pale past the dressing.  Gently wash your laceration 1-2 times daily with clean water and mild soap. It is okay to shower or run clean water over the laceration, but do not let the laceration soak in water (no swimming).  If your laceration  was closed with wound adhesive or strips: pat it dry and leave it open to the air. For all other repairs: after you wash your laceration, or at least 2 times a day, apply antibiotic ointment (such as Neosporin  or Bacitracin ) to the laceration, then cover it with a Band-Aid  or gauze.  Keep the laceration clean. Wear gloves or other protective clothing if you are around dirt.    Follow-up for removal:  If your wound was closed with staples or regular stitches, they need to be removed according to the instructions and timeline specified by your provider today.  If your wound was closed with absorbable ( dissolving ) sutures, they should fall out, dissolve, or not be visible in about one week. If they are still visible, then they should be removed according to the instructions and timeline specified by your provider today.    Scars:  To help minimize scarring:  Wear sunscreen over the healed laceration when out in the sun.  Massage the area regularly once healed.  You may apply Vitamin E to the healed wound.  Wait. Scars improve in appearance over months and years.    If you were given a prescription for medicine here today, be sure to read all of the information (including the package insert) that comes with your prescription.  This will include important information about the medicine, its side effects, and any warnings that you need to know about.  The pharmacist who fills the prescription can provide more information and answer questions you may have about the medicine.  If you have questions or concerns that the pharmacist cannot address, please call or return to the Emergency Department.       Remember that you can always come back to the Emergency Department if you are not able to see your regular provider in the amount of time listed above, if you get any new symptoms, or if there is anything that worries you.

## 2021-03-17 NOTE — ED NOTES
All patient questions have been answered, no further questions at this time. Discharge paperwork was gone over with patient. Patient verbalizes understanding of discharge teaching, wound care, medications, when to return and the importance of follow up.  Patient verbalizes feeling safe returning home at this time.

## 2021-03-17 NOTE — ED PROVIDER NOTES
"  History   Chief Complaint:  Laceration     HPI   Andrae Ruelas is a right handed 40 year old male who presents with laceration. The patient states he was at work with \"[employer]\" and was installing a bracket with a loose bolt, which hit his left  Hand. Bleeding was controlled prior to arrival. Denies any numbness or weakness in the fingers currently. Endorses pain with flexion. Denies any fever, chills, or vomiting. Patient's last Tdap was 2014.     Review of Systems   Constitutional: Negative for chills and fever.   Gastrointestinal: Negative for vomiting.   Skin: Positive for wound.   Neurological: Negative for weakness and numbness.   All other systems reviewed and are negative.    Allergies:  No known drug allergies     Medications:  Lexapro   Humira   Prilosec     Past Medical History:    GERD   Psoriasis   External hemorrhoids   Intussusception of jejunum   Oligoasthenoteratospermia   Colon polyp     Past Surgical History:    Myringotomy insert tube bilateral   Open reduction internal fixation ankle     Family History:    Brother: coronary artery disease   Mother: breast cancer, colorectal cancer     Social History:  Works at [employer]  Presents to emergency department alone     Physical Exam     Patient Vitals for the past 24 hrs:   BP Temp Temp src Pulse Resp SpO2 Height Weight   03/17/21 1257 (!) 151/114 99.2  F (37.3  C) Oral 95 20 99 % 1.822 m (5' 11.75\") 113.4 kg (250 lb)     Physical Exam  General: Sitting up in bed  Eyes:  The pupils are equal and round    Conjunctivae and sclerae are normal  ENT:    Wearing a mask  Neck:  Normal range of motion  CV:  Regular rate     Skin warm and well perfused   Resp:  Non labored breathing on room air    No tachypnea    No cough heard  MS:  No swelling on left hand. No pain on palpation of bones on left hand  Skin:  1/2 cm superficial laceration on left hand between thumb and pointer finger, no bleeding. No foreign body  Neuro:  "  Awake, alert.      Speech is normal and fluent.    Face is symmetric.     Full ROM of left hand fingers in flexion/extension, no weakness on left hand fingers on flexion/extension    SILT on left hand fingers  Psych: Normal affect.  Appropriate interactions.     Emergency Department Course   Procedures    Laceration Repair        LACERATION:  A simple minimally Contaminated 0.5 cm laceration.      LOCATION:  Left hand -between left thumb and pointer finger      FUNCTION:  Distally sensation, circulation and motor are intact.      PREPARATION:  Irrigation and Scrubbing with tap water      DEBRIDEMENT:  no debridement and wound explored, no foreign body found      CLOSURE:  Wound was closed with Dermabond       Emergency Department Course:    Reviewed:  I reviewed nursing notes, vitals, past medical history and care everywhere    Assessments:  1413 I obtained history and examined the patient as noted above.   1422 I rechecked the patient and performed the laceration repair as noted above.     Interventions:  1443 Tdap 0.5 mL IM     Disposition:  The patient was discharged to home.       Impression & Plan     Medical Decision Making:  Andrae Ruelas is a 40 year old male who presents with a laceration to the left hand. The wound was carefully evaluated and explored. The laceration was closed with dermabond as noted above. There is no evidence of muscular, tendon, or bony damage with this laceration. No signs of foreign body. Possible complications (infection, scarring) were reviewed with the patient. Tdap updated.    Diagnosis:    ICD-10-CM    1. Laceration of left hand without foreign body, initial encounter  S61.412A        Scribe Disclosure:  MARIANELA, Kristie Phelps, am serving as a scribe at 2:09 PM on 3/17/2021 to document services personally performed by Joy Roth MD based on my observations and the provider's statements to me.           Joy Roth MD  03/17/21 2662

## 2021-03-17 NOTE — LETTER
March 17, 2021      To Whom It May Concern:      Andrae Ruelas was seen in our Emergency Department today, 03/17/21. Please excuse him from work today. He can return to work but for next few days may have some mild limitation of hand due to laceration.    Sincerely,        Joy Roth MD

## 2021-03-17 NOTE — ED TRIAGE NOTES
Pt presents for evaluation of a laceration to the left palm while at work. Pt was working on a garage door, a screw came loose, causing a metal piece to cut his hand. Injury happened around noon today. Bleeding controlled. Numbness and tingling in the thumb, 2nd and 4th fingers. Last tetanus was in 2014.

## 2021-04-18 ENCOUNTER — HEALTH MAINTENANCE LETTER (OUTPATIENT)
Age: 41
End: 2021-04-18

## 2021-10-02 ENCOUNTER — HEALTH MAINTENANCE LETTER (OUTPATIENT)
Age: 41
End: 2021-10-02

## 2022-03-02 ENCOUNTER — MEDICAL CORRESPONDENCE (OUTPATIENT)
Dept: HEALTH INFORMATION MANAGEMENT | Facility: CLINIC | Age: 42
End: 2022-03-02
Payer: COMMERCIAL

## 2022-03-03 ENCOUNTER — TRANSCRIBE ORDERS (OUTPATIENT)
Dept: OTHER | Age: 42
End: 2022-03-03
Payer: COMMERCIAL

## 2022-03-03 DIAGNOSIS — H20.023: Primary | ICD-10-CM

## 2022-03-03 DIAGNOSIS — H15.002 SCLERITIS OF LEFT EYE: ICD-10-CM

## 2022-03-21 ENCOUNTER — OFFICE VISIT (OUTPATIENT)
Dept: OPHTHALMOLOGY | Facility: CLINIC | Age: 42
End: 2022-03-21
Attending: STUDENT IN AN ORGANIZED HEALTH CARE EDUCATION/TRAINING PROGRAM
Payer: COMMERCIAL

## 2022-03-21 DIAGNOSIS — H20.023: Primary | ICD-10-CM

## 2022-03-21 DIAGNOSIS — H15.002: ICD-10-CM

## 2022-03-21 DIAGNOSIS — Z79.899 HIGH RISK MEDICATION USE: ICD-10-CM

## 2022-03-21 DIAGNOSIS — L40.50 PSORIATIC ARTHRITIS (H): ICD-10-CM

## 2022-03-21 DIAGNOSIS — H15.102: ICD-10-CM

## 2022-03-21 DIAGNOSIS — H43.392 VITREOUS OPACITIES OF LEFT EYE: ICD-10-CM

## 2022-03-21 PROCEDURE — 92134 CPTRZ OPH DX IMG PST SGM RTA: CPT | Performed by: OPHTHALMOLOGY

## 2022-03-21 PROCEDURE — G0463 HOSPITAL OUTPT CLINIC VISIT: HCPCS

## 2022-03-21 PROCEDURE — 99203 OFFICE O/P NEW LOW 30 MIN: CPT | Performed by: OPHTHALMOLOGY

## 2022-03-21 RX ORDER — LORATADINE 10 MG/1
1 TABLET ORAL DAILY
COMMUNITY
Start: 2021-04-05

## 2022-03-21 RX ORDER — SECUKINUMAB 150 MG/ML
INJECTION SUBCUTANEOUS
COMMUNITY
Start: 2022-03-10

## 2022-03-21 RX ORDER — VALACYCLOVIR HYDROCHLORIDE 1 G/1
TABLET, FILM COATED ORAL
COMMUNITY
Start: 2021-02-15

## 2022-03-21 RX ORDER — METHYLPREDNISOLONE 4 MG
TABLET, DOSE PACK ORAL
Qty: 21 TABLET | Refills: 0 | Status: SHIPPED | OUTPATIENT
Start: 2022-03-21 | End: 2022-04-13

## 2022-03-21 RX ORDER — IBUPROFEN 200 MG
800 TABLET ORAL
COMMUNITY
Start: 2021-11-04

## 2022-03-21 RX ORDER — PREDNISOLONE ACETATE 10 MG/ML
1 SUSPENSION/ DROPS OPHTHALMIC 3 TIMES DAILY
Qty: 5 ML | Refills: 3 | Status: SHIPPED | OUTPATIENT
Start: 2022-03-21

## 2022-03-21 RX ORDER — EPINEPHRINE 0.3 MG/.3ML
INJECTION SUBCUTANEOUS
COMMUNITY

## 2022-03-21 RX ORDER — FLUOXETINE 40 MG/1
40 CAPSULE ORAL
COMMUNITY
Start: 2022-03-17 | End: 2023-03-17

## 2022-03-21 RX ORDER — PREDNISOLONE ACETATE 10 MG/ML
1 SUSPENSION/ DROPS OPHTHALMIC
COMMUNITY
Start: 2022-03-02 | End: 2022-03-21

## 2022-03-21 ASSESSMENT — REFRACTION_WEARINGRX
OD_CYLINDER: +1.00
SPECS_TYPE: SVL
OD_SPHERE: -4.00
OD_AXIS: 014
OS_SPHERE: -3.00
OS_AXIS: 000
OS_CYLINDER: +0.00

## 2022-03-21 ASSESSMENT — TONOMETRY
IOP_METHOD: TONOPEN
OS_IOP_MMHG: 18
OD_IOP_MMHG: 18

## 2022-03-21 ASSESSMENT — VISUAL ACUITY
OS_CC+: -1
OD_CC: 20/25
CORRECTION_TYPE: GLASSES
OD_PH_CC: 20/20
METHOD: SNELLEN - LINEAR
OS_CC: 20/40
OS_PH_CC: 20/20
OD_CC+: -1

## 2022-03-21 ASSESSMENT — CUP TO DISC RATIO
OD_RATIO: 0.3
OS_RATIO: 0.4

## 2022-03-21 ASSESSMENT — EXTERNAL EXAM - LEFT EYE: OS_EXAM: NORMAL

## 2022-03-21 ASSESSMENT — SLIT LAMP EXAM - LIDS
COMMENTS: NORMAL
COMMENTS: NORMAL

## 2022-03-21 ASSESSMENT — CONF VISUAL FIELD
METHOD: COUNTING FINGERS
OS_NORMAL: 1
OD_NORMAL: 1

## 2022-03-21 ASSESSMENT — PATIENT HEALTH QUESTIONNAIRE - PHQ9: SUM OF ALL RESPONSES TO PHQ QUESTIONS 1-9: 13

## 2022-03-21 ASSESSMENT — EXTERNAL EXAM - RIGHT EYE: OD_EXAM: NORMAL

## 2022-03-21 NOTE — PROGRESS NOTES
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 Nguyễn España at [facility] ophthalmology.    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  There was a flare last year in the right eye in June 2021 and then the left eye in September 2021. These were on Humira and the flares have typically responded to courses of prednisolone eyedrops including with the most recent flare in early March 2022 (while on Cosentyx)    Today the right eye is doing well, left eye intermittent discomfort, especially when tapering things down from 4x/day.     Additional Ocular History:   1.  History of blunt trauma to the right eye in 2017    Relevant Past Medical/Family/Social History:  1.  Psoriatic arthritis currently on Cosentyx. Doing generally well with a persistent plaque on the chest and left flank near armpit. Did not do as well on Humira which was stopped in September 2021 when switched back to Cosentyx. There was some wrist and hand soreness around that time    Maternal Grandparents may have Glaucoma.    Works as , recently completed Physical Therapy without need for surgery    Relevant Review of Systems: Plaques as above, some knee and elbow issues. Shoulders as above. There has been some intermittent issues with the feet, although less so on Cosentyx.    Laboratory Testing: Reviewed from 11/2021: CBC WNL, normal creatinine     Current eye related medications: Prednisolone 2x/day left eye (tapering down this week)    Retina/Uveitis Imaging:  OCT Spectralis Macula March 21, 2022  right eye: Normal contour, no fluid  left eye: Posterior vitreous opacities, no macular edema    Assessment:    1. Recurrent iridocyclitis, bilateral  Right eye  without symptoms nor inflammation    Left eye still active.     2. Scleritis/episcleritis, left  Secondary to #1. No thinning or nodules    3. Vitreous opacities of left eye  Seen on OCT without vitreous haze nor macular edema    4. Psoriatic arthritis (H)  Doing well on Cosentyx    5. High risk medication use  Cosentyx, follows with Dr. Laguna    Plan/Recommendations:    Discussed findings with patient. There is no inflammation in the right eye and the left eye is still active in spite of steroid drops. We will modify as below.     Eye pressure is normal in each eye. Eye pressure is normal in each eye and dilated exam left eye without intermediate uveitis and OCT scan without macular edema    Recommend additional diagnostic testing: None specifically    Continue Cosentyx. Given this has done well for Psoriatic arthritis, no specific change recommended in biologic therapy. Will try a course of oral steroid to see if this can help more rapidly resolve flare.     For the steroid drop, increase to 3x/day in the left eye. No dilating drop needed and no drops right eye     Add a Medrol dose pack to see if this can be tolerated and help inflammation. Follow the instructions for the week of use.     RTC 3 weeks tonopen, no dilation, no testing. End of the day visit    Physician Attestation     Attending Physician Attestation:  Complete documentation of historical and exam elements from today's encounter can be found in the full encounter summary report (not reduplicated in this progress note). I personally obtained the chief complaint(s) and history of present illness. I confirmed and edited as necessary the review of systems, past medical/surgical history, family history, social history, and examination findings as documented by others; and I examined the patient myself. I personally reviewed the relevant tests, images, and reports as documented above. I formulated and edited as necessary the assessment and plan and  discussed the findings and management plan with the patient and any family members present at the time of the visit.  Andrae Han M.D., Uveitis and Medical Retina, March 21, 2022

## 2022-03-21 NOTE — PATIENT INSTRUCTIONS
Continue Cosentyx. Given this has done well for Psoriatic arthritis, no specific change recommended in biologic therapy. Will try a course of oral steroid to see if this can help more rapidly resolve flare.     For the steroid drop, increase to 3x/day in the left eye until next visit. No dilating drop needed and no drops right eye     Add a Medrol dose pack to see if this can be tolerated and help inflammation. Follow the instructions for the week of use. Let us know if the medrol dose pack is not well tolerated.

## 2022-03-21 NOTE — LETTER
3/21/2022       RE: Andrae Ruelas  [address] [facility] [address]     Dear Colleague,    Thank you for referring your patient, Andrae Ruelas, to the [facility] [place] at [facility]. Please see a copy of my visit note below.    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 Nguyễn España at [facility].    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  There was a flare last year in the right eye in June 2021 and then the left eye in September 2021. These were on Humira and the flares have typically responded to courses of prednisolone eyedrops including with the most recent flare in early March 2022 (while on Cosentyx)    Today the right eye is doing well, left eye intermittent discomfort, especially when tapering things down from 4x/day.     Additional Ocular History:   1.  History of blunt trauma to the right eye in 2017    Relevant Past Medical/Family/Social History:  1.  Psoriatic arthritis currently on Cosentyx. Doing generally well with a persistent plaque on the chest and left flank near armpit. Did not do as well on Humira which was stopped in September 2021 when switched back to Cosentyx. There was some wrist and hand soreness around that time    Maternal Grandparents may have glaucoma.    Works as , recently completed physical therapy without need for surgery    Relevant Review of Systems: Plaques as above, some knee and elbow issues. Shoulders as above. There has been some intermittent issues with the feet, although less so on Cosentyx.    Laboratory Testing: Reviewed from 11/2021: CBC WNL, normal  creatinine     Current eye related medications: Prednisolone 2x/day left eye (tapering down this week)    Retina/Uveitis Imaging:  OCT Spectralis Macula March 21, 2022  right eye: Normal contour, no fluid  left eye: Posterior vitreous opacities, no macular edema    Assessment:    1. Recurrent iridocyclitis, bilateral  Right eye without symptoms nor inflammation    Left eye still active.     2. Scleritis/episcleritis, left  Secondary to #1. No thinning or nodules    3. Vitreous opacities of left eye  Seen on OCT without vitreous haze nor macular edema    4. Psoriatic arthritis (H)  Doing well on Cosentyx    5. High risk medication use  Cosentyx, follows with Dr. Laguna    Plan/Recommendations:    Discussed findings with patient. There is no inflammation in the right eye and the left eye is still active in spite of steroid drops. We will modify as below.     Eye pressure is normal in each eye and dilated exam left eye without intermediate uveitis and OCT scan without macular edema    Recommend additional diagnostic testing: None specifically    Continue Cosentyx. Given this has done well for Psoriatic arthritis, no specific change recommended in biologic therapy. Will try a course of oral steroid to see if this can help more rapidly resolve flare.     For the steroid drop, increase to 3x/day in the left eye. No dilating drop needed and no drops right eye     Add a Medrol dose pack to see if this can be tolerated and help inflammation. Follow the instructions for the week of use.     RTC 3 weeks tonopen, no dilation, no testing. End of the day visit                  Physician Attestation     Attending Physician Attestation:  Complete documentation of historical and exam elements from today's encounter can be found in the full encounter summary report (not reduplicated in this progress note). I personally obtained the chief complaint(s) and history of present illness. I confirmed and edited as necessary the review of  systems, past medical/surgical history, family history, social history, and examination findings as documented by others; and I examined the patient myself. I personally reviewed the relevant tests, images, and reports as documented above. I formulated and edited as necessary the assessment and plan and discussed the findings and management plan with the patient and any family members present at the time of the visit. Andrae Han M.D., Uveitis and Medical Retina, March 21, 2022     Sincerely,    Andrae Han MD  [facility] Dept of Ophthalmology  Uveitis and Medical Retina

## 2022-03-21 NOTE — NURSING NOTE
Chief Complaints and History of Present Illnesses   Patient presents with     Referral     Uveitis Evaluation      Chief Complaint(s) and History of Present Illness(es)     Referral               Uveitis Evaluation     Laterality: both eyes    Quality: blurred vision    Associated symptoms: eye pain, redness, headache, floaters and photophobia.  Negative for tearing and flashes    Treatments tried: eye drops    Pain scale: 2/10              Comments     Referral per Dr. Nguyễn España. Recurrent iridocyclitis, bilateral; Scleritis of left eye.  Left eye vision blurry.  Eye pain today 2/10 left eye only, aching.  Floaters each eye x 10 years.  Eye meds: prednisolone left eye BID (tapering), discontinued cyclopentolate last 3/17/2022 (stopped one week before, accidental instillation)  TONE Carrera 3/21/2022 1:28 PM

## 2022-04-13 ENCOUNTER — OFFICE VISIT (OUTPATIENT)
Dept: OPHTHALMOLOGY | Facility: CLINIC | Age: 42
End: 2022-04-13
Attending: OPHTHALMOLOGY
Payer: COMMERCIAL

## 2022-04-13 DIAGNOSIS — Z79.899 HIGH RISK MEDICATION USE: ICD-10-CM

## 2022-04-13 DIAGNOSIS — L40.50 PSORIATIC ARTHRITIS (H): ICD-10-CM

## 2022-04-13 DIAGNOSIS — H15.002: ICD-10-CM

## 2022-04-13 DIAGNOSIS — H20.023: Primary | ICD-10-CM

## 2022-04-13 DIAGNOSIS — H15.102: ICD-10-CM

## 2022-04-13 PROCEDURE — 99213 OFFICE O/P EST LOW 20 MIN: CPT | Performed by: OPHTHALMOLOGY

## 2022-04-13 PROCEDURE — G0463 HOSPITAL OUTPT CLINIC VISIT: HCPCS

## 2022-04-13 ASSESSMENT — VISUAL ACUITY
OS_CC+: -3
OD_CC: 20/25
CORRECTION_TYPE: GLASSES
OD_CC+: -1
OS_CC: 20/25
METHOD: SNELLEN - LINEAR

## 2022-04-13 ASSESSMENT — TONOMETRY
OD_IOP_MMHG: 19
IOP_METHOD: TONOPEN
OS_IOP_MMHG: 14

## 2022-04-13 ASSESSMENT — SLIT LAMP EXAM - LIDS
COMMENTS: NORMAL
COMMENTS: NORMAL

## 2022-04-13 ASSESSMENT — REFRACTION_WEARINGRX
OD_CYLINDER: +1.00
OS_CYLINDER: +0.00
SPECS_TYPE: SVL
OS_SPHERE: -3.00
OS_AXIS: 000
OD_AXIS: 014
OD_SPHERE: -4.00

## 2022-04-13 ASSESSMENT — EXTERNAL EXAM - LEFT EYE: OS_EXAM: NORMAL

## 2022-04-13 ASSESSMENT — CONF VISUAL FIELD
OS_NORMAL: 1
OD_NORMAL: 1

## 2022-04-13 ASSESSMENT — CUP TO DISC RATIO
OS_RATIO: 0.4
OD_RATIO: 0.3

## 2022-04-13 ASSESSMENT — EXTERNAL EXAM - RIGHT EYE: OD_EXAM: NORMAL

## 2022-04-13 NOTE — PROGRESS NOTES
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  He was last seen on March 21, 2022 at which time there was no active inflammation in the right eye but the left eye was still active.  We recommended continuing Cosentyx and increasing the steroid eyedrop to 3 times a day in the left eye.  We also recommended 1 week Medrol Dosepak to determine if this could provide some additional inflammation control.    Andrae reports things did well after the Medrol dose pack. Finished steroid drop one week later. Going well since.    Interval Updates to Medical/Family/Social History: No other health changes     Relevant Review of Systems Updates:  Mild foot stiffness    Laboratory Testing: None since last visit     Current eye related medications: Cosentyx, Prednisolone drops finished two weeks ago, completed Medrol Dosepak    Retina/Uveitis Imaging: None today    Assessment:     1. Recurrent iridocyclitis, bilateral  Right eye remains inactive. Left eye much improved    2. Scleritis/episcleritis, left  Resolved without thinning    3. Psoriatic arthritis (H)  Mild joint stiffness in feet    4. High risk medication use  Cosentyx, now off medrol dose pack    Plan/Recommendations:      Discussed findings with patient. The iritis has essentially resolved after the Medrol dose pack    Eye pressure is normal in each eye     Recommend additional testing: None at this time    Continue Cosentyx with Dr. Laguna. Will write to Dr. Laguna about possibility of different medicine to reduce iritis flare occurrence.He might consider newer biologics such as Simponi or Cimzia or Remicade, which has been around for some time.. The choice of biologics is sometimes affected by effectiveness/tolerance and insurance. Any of these could potentially work well,  although data on iritis recurrence is less robust than for Remicade. The safest thing would be to continue Cosentyx which is working well for Psoriatic arthritis at this time. Would kindly ask Dr. Laguna to avoid Enbrel if possible given risk of worsening iritis    No oral medrol dose pack, no steroid drops needed at this time    If thing flare, start the drop 3x/day in the affected eye for 3 days. If not better, then call/message for an appointment.     RTC 3 months tonopen, no dilation, no testing    Physician Attestation     Attending Physician Attestation:  Complete documentation of historical and exam elements from today's encounter can be found in the full encounter summary report (not reduplicated in this progress note). I personally obtained the chief complaint(s) and history of present illness. I confirmed and edited as necessary the review of systems, past medical/surgical history, family history, social history, and examination findings as documented by others; and I examined the patient myself. I personally reviewed the relevant tests, images, and reports as documented above. I formulated and edited as necessary the assessment and plan and discussed the findings and management plan with the patient and family members present at the time of this visit.  Andrae Han M.D., Uveitis and Medical Retina, April 13, 2022

## 2022-04-13 NOTE — PATIENT INSTRUCTIONS
No oral medrol dose pack, no steroid drops needed at this time  Will write to Dr. Laguna about possibility of different medicine to reduce flare occurrence. Would avoid Enbrel given risk of worsening iritis.   If thing flare, start the drop 3x/day in the affected eye for 3 days. If not better, then call/message for an appointment.

## 2022-04-13 NOTE — NURSING NOTE
Chief Complaints and History of Present Illnesses   Patient presents with     Follow Up     Pt here for follow up on Recurrent iridocyclitis, bilateral; Scleritis of left eye.     Chief Complaint(s) and History of Present Illness(es)     Follow Up     Laterality: both eyes    Associated symptoms: Negative for eye pain, tearing, headache and photophobia    Comments: Pt here for follow up on Recurrent iridocyclitis, bilateral; Scleritis of left eye.              Comments     Vision is stable since last exam. No new concerns.   Pt using:  REGAN Bernal 2:41 PM April 13, 2022

## 2022-04-13 NOTE — LETTER
4/13/2022       RE: Andrae Ruelas  [address] [place] [id number]     Dear Colleague,    Thank you for referring your patient, Andrae Ruelas, to the [facility] [place] at [facility]. Please see a copy of my visit note below.    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  He was last seen on March 21, 2022 at which time there was no active inflammation in the right eye but the left eye was still active.  We recommended continuing Cosentyx and increasing the steroid eyedrop to 3 times a day in the left eye.  We also recommended 1 week Medrol Dosepak to determine if this could provide some additional inflammation control.    Andrae reports things did well after the Medrol dose pack. Finished steroid drop one week later. Going well since.    Interval Updates to Medical/Family/Social History: No other health changes     Relevant Review of Systems Updates:  Mild foot stiffness    Laboratory Testing: None since last visit     Current eye related medications: Cosentyx, Prednisolone drops finished two weeks ago, completed Medrol Dosepak    Retina/Uveitis Imaging: None today          Assessment:     1. Recurrent iridocyclitis, bilateral  Right eye remains inactive. Left eye much improved    2. Scleritis/episcleritis, left  Resolved without thinning    3. Psoriatic arthritis (H)  Mild joint stiffness in feet    4. High risk medication use  Cosentyx, now off medrol dose pack    Plan/Recommendations:      Discussed findings with patient. The iritis has essentially resolved after the Medrol dose pack    Eye pressure is normal in each eye     Recommend additional testing: None at this time    Continue Cosentyx with Dr. Laguna. Will  write to Dr. Laguna about possibility of different medicine to reduce iritis flare occurrence.He might consider newer biologics such as Simponi or Cimzia or Remicade, which has been around for some time.. The choice of biologics is sometimes affected by effectiveness/tolerance and insurance. Any of these could potentially work well, although data on iritis recurrence is less robust than for Remicade. The safest thing would be to continue Cosentyx which is working well for Psoriatic arthritis at this time. Would kindly ask Dr. Laguna to avoid Enbrel if possible given risk of worsening iritis    No oral medrol dose pack, no steroid drops needed at this time    If thing flare, start the drop 3x/day in the affected eye for 3 days. If not better, then call/message for an appointment.     RTC 3 months tonopen, no dilation, no testing    Physician Attestation     Attending Physician Attestation:  Complete documentation of historical and exam elements from today's encounter can be found in the full encounter summary report (not reduplicated in this progress note). I personally obtained the chief complaint(s) and history of present illness. I confirmed and edited as necessary the review of systems, past medical/surgical history, family history, social history, and examination findings as documented by others; and I examined the patient myself. I personally reviewed the relevant tests, images, and reports as documented above. I formulated and edited as necessary the assessment and plan and discussed the findings and management plan with the patient and family members present at the time of this visit.  Andrae Han M.D., Uveitis and Medical Retina, April 13, 2022     Sincerely,    Andrae Han MD  [facility] Dept of Ophthalmology  Uveitis and Medical Retina

## 2022-05-14 ENCOUNTER — HEALTH MAINTENANCE LETTER (OUTPATIENT)
Age: 42
End: 2022-05-14

## 2022-09-03 ENCOUNTER — HEALTH MAINTENANCE LETTER (OUTPATIENT)
Age: 42
End: 2022-09-03

## 2023-06-03 ENCOUNTER — HEALTH MAINTENANCE LETTER (OUTPATIENT)
Age: 43
End: 2023-06-03

## 2024-05-22 ENCOUNTER — TRANSFERRED RECORDS (OUTPATIENT)
Dept: HEALTH INFORMATION MANAGEMENT | Facility: CLINIC | Age: 44
End: 2024-05-22
Payer: COMMERCIAL

## 2024-07-06 ENCOUNTER — HEALTH MAINTENANCE LETTER (OUTPATIENT)
Age: 44
End: 2024-07-06